# Patient Record
Sex: FEMALE | Race: WHITE | Employment: UNEMPLOYED | URBAN - METROPOLITAN AREA
[De-identification: names, ages, dates, MRNs, and addresses within clinical notes are randomized per-mention and may not be internally consistent; named-entity substitution may affect disease eponyms.]

---

## 2020-03-29 ENCOUNTER — APPOINTMENT (OUTPATIENT)
Dept: GENERAL RADIOLOGY | Age: 60
DRG: 853 | End: 2020-03-29
Attending: EMERGENCY MEDICINE
Payer: COMMERCIAL

## 2020-03-29 ENCOUNTER — APPOINTMENT (OUTPATIENT)
Dept: GENERAL RADIOLOGY | Age: 60
DRG: 853 | End: 2020-03-29
Attending: UROLOGY
Payer: COMMERCIAL

## 2020-03-29 ENCOUNTER — ANESTHESIA (OUTPATIENT)
Dept: SURGERY | Age: 60
DRG: 853 | End: 2020-03-29
Payer: COMMERCIAL

## 2020-03-29 ENCOUNTER — ANESTHESIA EVENT (OUTPATIENT)
Dept: SURGERY | Age: 60
DRG: 853 | End: 2020-03-29
Payer: COMMERCIAL

## 2020-03-29 ENCOUNTER — HOSPITAL ENCOUNTER (INPATIENT)
Age: 60
LOS: 2 days | Discharge: HOME | DRG: 853 | End: 2020-03-31
Attending: EMERGENCY MEDICINE | Admitting: SURGERY
Payer: COMMERCIAL

## 2020-03-29 DIAGNOSIS — A41.9 SEPSIS, DUE TO UNSPECIFIED ORGANISM, UNSPECIFIED WHETHER ACUTE ORGAN DYSFUNCTION PRESENT (HCC): Primary | ICD-10-CM

## 2020-03-29 PROBLEM — R57.1 HYPOVOLEMIC SHOCK (HCC): Status: ACTIVE | Noted: 2020-03-29

## 2020-03-29 PROBLEM — N18.4 KIDNEY DISEASE, CHRONIC, STAGE IV (SEVERE, EGFR 15-29 ML/MIN) (HCC): Chronic | Status: ACTIVE | Noted: 2020-03-29

## 2020-03-29 PROBLEM — E11.42 DIABETIC POLYNEUROPATHY ASSOCIATED WITH TYPE 2 DIABETES MELLITUS (HCC): Status: ACTIVE | Noted: 2020-03-29

## 2020-03-29 PROBLEM — N18.9 ACUTE KIDNEY INJURY SUPERIMPOSED ON CKD (HCC): Chronic | Status: ACTIVE | Noted: 2020-03-29

## 2020-03-29 PROBLEM — F41.1 GENERALIZED ANXIETY DISORDER: Chronic | Status: ACTIVE | Noted: 2020-03-29

## 2020-03-29 PROBLEM — N13.2 HYDRONEPHROSIS WITH OBSTRUCTING CALCULUS: Status: ACTIVE | Noted: 2020-03-29

## 2020-03-29 PROBLEM — N17.9 ACUTE KIDNEY INJURY SUPERIMPOSED ON CKD (HCC): Chronic | Status: ACTIVE | Noted: 2020-03-29

## 2020-03-29 PROBLEM — E86.0 SEVERE DEHYDRATION: Status: ACTIVE | Noted: 2020-03-29

## 2020-03-29 LAB
ABO + RH BLD: NORMAL
ADMINISTERED INITIALS, ADMINIT: NORMAL
ALBUMIN SERPL-MCNC: 2 G/DL (ref 3.5–5)
ALBUMIN SERPL-MCNC: 2 G/DL (ref 3.5–5)
ALBUMIN/GLOB SERPL: 0.5 {RATIO} (ref 1.1–2.2)
ALBUMIN/GLOB SERPL: 0.5 {RATIO} (ref 1.1–2.2)
ALP SERPL-CCNC: 146 U/L (ref 45–117)
ALP SERPL-CCNC: 153 U/L (ref 45–117)
ALT SERPL-CCNC: 32 U/L (ref 12–78)
ALT SERPL-CCNC: 33 U/L (ref 12–78)
ANION GAP SERPL CALC-SCNC: 11 MMOL/L (ref 5–15)
ANION GAP SERPL CALC-SCNC: 7 MMOL/L (ref 5–15)
APPEARANCE UR: CLEAR
APTT PPP: 33.6 SEC (ref 22.1–32)
AST SERPL-CCNC: 21 U/L (ref 15–37)
AST SERPL-CCNC: 22 U/L (ref 15–37)
ATRIAL RATE: 83 BPM
BACTERIA URNS QL MICRO: NEGATIVE /HPF
BASOPHILS # BLD: 0 K/UL (ref 0–0.1)
BASOPHILS NFR BLD: 0 % (ref 0–1)
BILIRUB DIRECT SERPL-MCNC: 0.2 MG/DL (ref 0–0.2)
BILIRUB SERPL-MCNC: 0.4 MG/DL (ref 0.2–1)
BILIRUB SERPL-MCNC: 0.4 MG/DL (ref 0.2–1)
BILIRUB UR QL: NEGATIVE
BLOOD GROUP ANTIBODIES SERPL: NORMAL
BUN SERPL-MCNC: 30 MG/DL (ref 6–20)
BUN SERPL-MCNC: 38 MG/DL (ref 6–20)
BUN/CREAT SERPL: 19 (ref 12–20)
BUN/CREAT SERPL: 22 (ref 12–20)
CALCIUM SERPL-MCNC: 8.3 MG/DL (ref 8.5–10.1)
CALCIUM SERPL-MCNC: 8.4 MG/DL (ref 8.5–10.1)
CALCULATED P AXIS, ECG09: 11 DEGREES
CALCULATED R AXIS, ECG10: 63 DEGREES
CALCULATED T AXIS, ECG11: 93 DEGREES
CHLORIDE SERPL-SCNC: 103 MMOL/L (ref 97–108)
CHLORIDE SERPL-SCNC: 110 MMOL/L (ref 97–108)
CO2 SERPL-SCNC: 18 MMOL/L (ref 21–32)
CO2 SERPL-SCNC: 21 MMOL/L (ref 21–32)
COLOR UR: ABNORMAL
CREAT SERPL-MCNC: 1.56 MG/DL (ref 0.55–1.02)
CREAT SERPL-MCNC: 1.76 MG/DL (ref 0.55–1.02)
D50 ADMINISTERED, D50ADM: 0 ML
D50 ORDER, D50ORD: 0 ML
DIAGNOSIS, 93000: NORMAL
DIFFERENTIAL METHOD BLD: ABNORMAL
EOSINOPHIL # BLD: 0 K/UL (ref 0–0.4)
EOSINOPHIL NFR BLD: 0 % (ref 0–7)
EPITH CASTS URNS QL MICRO: ABNORMAL /LPF
ERYTHROCYTE [DISTWIDTH] IN BLOOD BY AUTOMATED COUNT: 13.3 % (ref 11.5–14.5)
EST. AVERAGE GLUCOSE BLD GHB EST-MCNC: 346 MG/DL
GLOBULIN SER CALC-MCNC: 4.2 G/DL (ref 2–4)
GLOBULIN SER CALC-MCNC: 4.2 G/DL (ref 2–4)
GLSCOM COMMENTS: NORMAL
GLUCOSE BLD STRIP.AUTO-MCNC: 140 MG/DL (ref 65–100)
GLUCOSE BLD STRIP.AUTO-MCNC: 145 MG/DL (ref 65–100)
GLUCOSE BLD STRIP.AUTO-MCNC: 158 MG/DL (ref 65–100)
GLUCOSE BLD STRIP.AUTO-MCNC: 232 MG/DL (ref 65–100)
GLUCOSE BLD STRIP.AUTO-MCNC: 348 MG/DL (ref 65–100)
GLUCOSE BLD STRIP.AUTO-MCNC: 395 MG/DL (ref 65–100)
GLUCOSE BLD STRIP.AUTO-MCNC: 485 MG/DL (ref 65–100)
GLUCOSE SERPL-MCNC: 307 MG/DL (ref 65–100)
GLUCOSE SERPL-MCNC: 401 MG/DL (ref 65–100)
GLUCOSE UR STRIP.AUTO-MCNC: >1000 MG/DL
GLUCOSE, GLC: 140 MG/DL
GLUCOSE, GLC: 145 MG/DL
GLUCOSE, GLC: 158 MG/DL
GLUCOSE, GLC: 232 MG/DL
GLUCOSE, GLC: 345 MG/DL
GLUCOSE, GLC: 399 MG/DL
HBA1C MFR BLD: 13.7 % (ref 4–5.6)
HCT VFR BLD AUTO: 28.5 % (ref 35–47)
HGB BLD-MCNC: 8.8 G/DL (ref 11.5–16)
HGB UR QL STRIP: ABNORMAL
HIGH TARGET, HITG: 140 MG/DL
IMM GRANULOCYTES # BLD AUTO: 0 K/UL
IMM GRANULOCYTES NFR BLD AUTO: 0 %
INR PPP: 1.1 (ref 0.9–1.1)
INSULIN ADMINSTERED, INSADM: 10.2 UNITS/HOUR
INSULIN ADMINSTERED, INSADM: 11.4 UNITS/HOUR
INSULIN ADMINSTERED, INSADM: 4.8 UNITS/HOUR
INSULIN ADMINSTERED, INSADM: 4.9 UNITS/HOUR
INSULIN ADMINSTERED, INSADM: 5.1 UNITS/HOUR
INSULIN ADMINSTERED, INSADM: 6.9 UNITS/HOUR
INSULIN ORDER, INSORD: 10.2 UNITS/HOUR
INSULIN ORDER, INSORD: 11.4 UNITS/HOUR
INSULIN ORDER, INSORD: 4.8 UNITS/HOUR
INSULIN ORDER, INSORD: 4.9 UNITS/HOUR
INSULIN ORDER, INSORD: 5.1 UNITS/HOUR
INSULIN ORDER, INSORD: 6.9 UNITS/HOUR
KETONES UR QL STRIP.AUTO: ABNORMAL MG/DL
LACTATE SERPL-SCNC: 1.2 MMOL/L (ref 0.4–2)
LEUKOCYTE ESTERASE UR QL STRIP.AUTO: ABNORMAL
LIPASE SERPL-CCNC: 70 U/L (ref 73–393)
LOW TARGET, LOT: 100 MG/DL
LYMPHOCYTES # BLD: 1.3 K/UL (ref 0.8–3.5)
LYMPHOCYTES NFR BLD: 12 % (ref 12–49)
MAGNESIUM SERPL-MCNC: 1.8 MG/DL (ref 1.6–2.4)
MAGNESIUM SERPL-MCNC: 2 MG/DL (ref 1.6–2.4)
MCH RBC QN AUTO: 25 PG (ref 26–34)
MCHC RBC AUTO-ENTMCNC: 30.9 G/DL (ref 30–36.5)
MCV RBC AUTO: 81 FL (ref 80–99)
MINUTES UNTIL NEXT BG, NBG: 60 MIN
MONOCYTES # BLD: 0.8 K/UL (ref 0–1)
MONOCYTES NFR BLD: 7 % (ref 5–13)
MULTIPLIER, MUL: 0.03
MULTIPLIER, MUL: 0.04
MULTIPLIER, MUL: 0.04
MULTIPLIER, MUL: 0.05
MULTIPLIER, MUL: 0.06
MULTIPLIER, MUL: 0.06
NEUTS SEG # BLD: 9 K/UL (ref 1.8–8)
NEUTS SEG NFR BLD: 81 % (ref 32–75)
NITRITE UR QL STRIP.AUTO: NEGATIVE
NRBC # BLD: 0 K/UL (ref 0–0.01)
NRBC BLD-RTO: 0 PER 100 WBC
ORDER INITIALS, ORDINIT: NORMAL
P-R INTERVAL, ECG05: 152 MS
PH UR STRIP: 5 [PH] (ref 5–8)
PHOSPHATE SERPL-MCNC: 2.3 MG/DL (ref 2.6–4.7)
PHOSPHATE SERPL-MCNC: 3 MG/DL (ref 2.6–4.7)
PLATELET # BLD AUTO: 200 K/UL (ref 150–400)
PMV BLD AUTO: 10.7 FL (ref 8.9–12.9)
POTASSIUM SERPL-SCNC: 3.3 MMOL/L (ref 3.5–5.1)
POTASSIUM SERPL-SCNC: 3.6 MMOL/L (ref 3.5–5.1)
PROCALCITONIN SERPL-MCNC: 101.45 NG/ML
PROT SERPL-MCNC: 6.2 G/DL (ref 6.4–8.2)
PROT SERPL-MCNC: 6.2 G/DL (ref 6.4–8.2)
PROT UR STRIP-MCNC: NEGATIVE MG/DL
PROTHROMBIN TIME: 11.7 SEC (ref 9–11.1)
Q-T INTERVAL, ECG07: 386 MS
QRS DURATION, ECG06: 86 MS
QTC CALCULATION (BEZET), ECG08: 453 MS
RBC # BLD AUTO: 3.52 M/UL (ref 3.8–5.2)
RBC #/AREA URNS HPF: ABNORMAL /HPF (ref 0–5)
RBC MORPH BLD: ABNORMAL
SERVICE CMNT-IMP: ABNORMAL
SODIUM SERPL-SCNC: 132 MMOL/L (ref 136–145)
SODIUM SERPL-SCNC: 138 MMOL/L (ref 136–145)
SP GR UR REFRACTOMETRY: 1.01 (ref 1–1.03)
SPECIMEN EXP DATE BLD: NORMAL
THERAPEUTIC RANGE,PTTT: ABNORMAL SECS (ref 58–77)
UROBILINOGEN UR QL STRIP.AUTO: 0.2 EU/DL (ref 0.2–1)
VENTRICULAR RATE, ECG03: 83 BPM
WBC # BLD AUTO: 11.1 K/UL (ref 3.6–11)
WBC URNS QL MICRO: ABNORMAL /HPF (ref 0–4)

## 2020-03-29 PROCEDURE — 83735 ASSAY OF MAGNESIUM: CPT

## 2020-03-29 PROCEDURE — 74011000258 HC RX REV CODE- 258: Performed by: SURGERY

## 2020-03-29 PROCEDURE — 85610 PROTHROMBIN TIME: CPT

## 2020-03-29 PROCEDURE — 84100 ASSAY OF PHOSPHORUS: CPT

## 2020-03-29 PROCEDURE — 82962 GLUCOSE BLOOD TEST: CPT

## 2020-03-29 PROCEDURE — 87086 URINE CULTURE/COLONY COUNT: CPT

## 2020-03-29 PROCEDURE — 74011000258 HC RX REV CODE- 258: Performed by: ANESTHESIOLOGY

## 2020-03-29 PROCEDURE — 36415 COLL VENOUS BLD VENIPUNCTURE: CPT

## 2020-03-29 PROCEDURE — 87186 SC STD MICRODIL/AGAR DIL: CPT

## 2020-03-29 PROCEDURE — 76210000006 HC OR PH I REC 0.5 TO 1 HR: Performed by: UROLOGY

## 2020-03-29 PROCEDURE — BT141ZZ FLUOROSCOPY OF KIDNEYS, URETERS AND BLADDER USING LOW OSMOLAR CONTRAST: ICD-10-PCS | Performed by: UROLOGY

## 2020-03-29 PROCEDURE — 77030018832 HC SOL IRR H20 ICUM -A: Performed by: UROLOGY

## 2020-03-29 PROCEDURE — 74011250636 HC RX REV CODE- 250/636: Performed by: NURSE ANESTHETIST, CERTIFIED REGISTERED

## 2020-03-29 PROCEDURE — 85730 THROMBOPLASTIN TIME PARTIAL: CPT

## 2020-03-29 PROCEDURE — 0T778DZ DILATION OF LEFT URETER WITH INTRALUMINAL DEVICE, VIA NATURAL OR ARTIFICIAL OPENING ENDOSCOPIC: ICD-10-PCS | Performed by: UROLOGY

## 2020-03-29 PROCEDURE — 74011250637 HC RX REV CODE- 250/637: Performed by: SURGERY

## 2020-03-29 PROCEDURE — 74420 UROGRAPHY RTRGR +-KUB: CPT

## 2020-03-29 PROCEDURE — 77030019927 HC TBNG IRR CYSTO BAXT -A: Performed by: UROLOGY

## 2020-03-29 PROCEDURE — 94762 N-INVAS EAR/PLS OXIMTRY CONT: CPT

## 2020-03-29 PROCEDURE — 74011250636 HC RX REV CODE- 250/636: Performed by: HOSPITALIST

## 2020-03-29 PROCEDURE — 77030008684 HC TU ET CUF COVD -B: Performed by: ANESTHESIOLOGY

## 2020-03-29 PROCEDURE — 80076 HEPATIC FUNCTION PANEL: CPT

## 2020-03-29 PROCEDURE — 87077 CULTURE AEROBIC IDENTIFY: CPT

## 2020-03-29 PROCEDURE — 76010000138 HC OR TIME 0.5 TO 1 HR: Performed by: UROLOGY

## 2020-03-29 PROCEDURE — 74011250636 HC RX REV CODE- 250/636: Performed by: SURGERY

## 2020-03-29 PROCEDURE — C1758 CATHETER, URETERAL: HCPCS | Performed by: UROLOGY

## 2020-03-29 PROCEDURE — 86900 BLOOD TYPING SEROLOGIC ABO: CPT

## 2020-03-29 PROCEDURE — 80053 COMPREHEN METABOLIC PANEL: CPT

## 2020-03-29 PROCEDURE — 83036 HEMOGLOBIN GLYCOSYLATED A1C: CPT

## 2020-03-29 PROCEDURE — 84145 PROCALCITONIN (PCT): CPT

## 2020-03-29 PROCEDURE — 81001 URINALYSIS AUTO W/SCOPE: CPT

## 2020-03-29 PROCEDURE — 74011250637 HC RX REV CODE- 250/637: Performed by: HOSPITALIST

## 2020-03-29 PROCEDURE — 83605 ASSAY OF LACTIC ACID: CPT

## 2020-03-29 PROCEDURE — 77030026438 HC STYL ET INTUB CARD -A: Performed by: ANESTHESIOLOGY

## 2020-03-29 PROCEDURE — 65620000000 HC RM CCU GENERAL

## 2020-03-29 PROCEDURE — 74011000250 HC RX REV CODE- 250: Performed by: SURGERY

## 2020-03-29 PROCEDURE — 74011636637 HC RX REV CODE- 636/637: Performed by: ANESTHESIOLOGY

## 2020-03-29 PROCEDURE — 99285 EMERGENCY DEPT VISIT HI MDM: CPT

## 2020-03-29 PROCEDURE — P9045 ALBUMIN (HUMAN), 5%, 250 ML: HCPCS | Performed by: NURSE ANESTHETIST, CERTIFIED REGISTERED

## 2020-03-29 PROCEDURE — 76060000033 HC ANESTHESIA 1 TO 1.5 HR: Performed by: UROLOGY

## 2020-03-29 PROCEDURE — P9045 ALBUMIN (HUMAN), 5%, 250 ML: HCPCS | Performed by: SURGERY

## 2020-03-29 PROCEDURE — 74011000250 HC RX REV CODE- 250: Performed by: NURSE ANESTHETIST, CERTIFIED REGISTERED

## 2020-03-29 PROCEDURE — 83690 ASSAY OF LIPASE: CPT

## 2020-03-29 PROCEDURE — 85025 COMPLETE CBC W/AUTO DIFF WBC: CPT

## 2020-03-29 PROCEDURE — 93005 ELECTROCARDIOGRAM TRACING: CPT

## 2020-03-29 PROCEDURE — 74011636637 HC RX REV CODE- 636/637: Performed by: HOSPITALIST

## 2020-03-29 PROCEDURE — C2617 STENT, NON-COR, TEM W/O DEL: HCPCS | Performed by: UROLOGY

## 2020-03-29 DEVICE — URETERAL STENT
Type: IMPLANTABLE DEVICE | Status: FUNCTIONAL
Brand: CONTOUR™

## 2020-03-29 RX ORDER — MORPHINE SULFATE 4 MG/ML
INJECTION INTRAVENOUS AS NEEDED
Status: DISCONTINUED | OUTPATIENT
Start: 2020-03-29 | End: 2020-03-29 | Stop reason: HOSPADM

## 2020-03-29 RX ORDER — ALBUMIN HUMAN 50 G/1000ML
SOLUTION INTRAVENOUS AS NEEDED
Status: DISCONTINUED | OUTPATIENT
Start: 2020-03-29 | End: 2020-03-29 | Stop reason: HOSPADM

## 2020-03-29 RX ORDER — SODIUM CHLORIDE 0.9 % (FLUSH) 0.9 %
5-40 SYRINGE (ML) INJECTION AS NEEDED
Status: DISCONTINUED | OUTPATIENT
Start: 2020-03-29 | End: 2020-03-31 | Stop reason: HOSPADM

## 2020-03-29 RX ORDER — IBUPROFEN 800 MG/1
800 TABLET ORAL
COMMUNITY
End: 2020-03-31

## 2020-03-29 RX ORDER — ATORVASTATIN CALCIUM 20 MG/1
20 TABLET, FILM COATED ORAL DAILY
COMMUNITY

## 2020-03-29 RX ORDER — LIDOCAINE HYDROCHLORIDE 20 MG/ML
INJECTION, SOLUTION EPIDURAL; INFILTRATION; INTRACAUDAL; PERINEURAL AS NEEDED
Status: DISCONTINUED | OUTPATIENT
Start: 2020-03-29 | End: 2020-03-29 | Stop reason: HOSPADM

## 2020-03-29 RX ORDER — SUCCINYLCHOLINE CHLORIDE 20 MG/ML
INJECTION INTRAMUSCULAR; INTRAVENOUS AS NEEDED
Status: DISCONTINUED | OUTPATIENT
Start: 2020-03-29 | End: 2020-03-29 | Stop reason: HOSPADM

## 2020-03-29 RX ORDER — OMEPRAZOLE 20 MG/1
20 TABLET, DELAYED RELEASE ORAL DAILY
COMMUNITY

## 2020-03-29 RX ORDER — GABAPENTIN 100 MG/1
100 CAPSULE ORAL 3 TIMES DAILY
COMMUNITY

## 2020-03-29 RX ORDER — MAGNESIUM SULFATE 100 %
4 CRYSTALS MISCELLANEOUS AS NEEDED
Status: DISCONTINUED | OUTPATIENT
Start: 2020-03-29 | End: 2020-03-29 | Stop reason: SDUPTHER

## 2020-03-29 RX ORDER — SODIUM CHLORIDE, SODIUM LACTATE, POTASSIUM CHLORIDE, CALCIUM CHLORIDE 600; 310; 30; 20 MG/100ML; MG/100ML; MG/100ML; MG/100ML
75 INJECTION, SOLUTION INTRAVENOUS CONTINUOUS
Status: DISCONTINUED | OUTPATIENT
Start: 2020-03-29 | End: 2020-03-30

## 2020-03-29 RX ORDER — DEXTROSE MONOHYDRATE 100 MG/ML
0-250 INJECTION, SOLUTION INTRAVENOUS AS NEEDED
Status: DISCONTINUED | OUTPATIENT
Start: 2020-03-29 | End: 2020-03-31 | Stop reason: HOSPADM

## 2020-03-29 RX ORDER — PROPRANOLOL HYDROCHLORIDE 40 MG/1
20 TABLET ORAL 2 TIMES DAILY
COMMUNITY

## 2020-03-29 RX ORDER — PROPOFOL 10 MG/ML
INJECTION, EMULSION INTRAVENOUS AS NEEDED
Status: DISCONTINUED | OUTPATIENT
Start: 2020-03-29 | End: 2020-03-29 | Stop reason: HOSPADM

## 2020-03-29 RX ORDER — MORPHINE SULFATE 2 MG/ML
2 INJECTION, SOLUTION INTRAMUSCULAR; INTRAVENOUS
Status: DISCONTINUED | OUTPATIENT
Start: 2020-03-29 | End: 2020-03-31 | Stop reason: HOSPADM

## 2020-03-29 RX ORDER — GABAPENTIN 100 MG/1
100 CAPSULE ORAL 3 TIMES DAILY
Status: DISCONTINUED | OUTPATIENT
Start: 2020-03-29 | End: 2020-03-31 | Stop reason: HOSPADM

## 2020-03-29 RX ORDER — FENTANYL CITRATE 50 UG/ML
INJECTION, SOLUTION INTRAMUSCULAR; INTRAVENOUS AS NEEDED
Status: DISCONTINUED | OUTPATIENT
Start: 2020-03-29 | End: 2020-03-29 | Stop reason: HOSPADM

## 2020-03-29 RX ORDER — HYDROCODONE BITARTRATE AND ACETAMINOPHEN 5; 325 MG/1; MG/1
1 TABLET ORAL
COMMUNITY

## 2020-03-29 RX ORDER — MAGNESIUM SULFATE 100 %
4 CRYSTALS MISCELLANEOUS AS NEEDED
Status: DISCONTINUED | OUTPATIENT
Start: 2020-03-29 | End: 2020-03-30

## 2020-03-29 RX ORDER — SODIUM CHLORIDE 0.9 % (FLUSH) 0.9 %
5-40 SYRINGE (ML) INJECTION EVERY 8 HOURS
Status: DISCONTINUED | OUTPATIENT
Start: 2020-03-29 | End: 2020-03-31 | Stop reason: HOSPADM

## 2020-03-29 RX ORDER — TOPIRAMATE 25 MG/1
25 TABLET ORAL 2 TIMES DAILY
COMMUNITY

## 2020-03-29 RX ORDER — DEXTROSE MONOHYDRATE 100 MG/ML
0-250 INJECTION, SOLUTION INTRAVENOUS AS NEEDED
Status: DISCONTINUED | OUTPATIENT
Start: 2020-03-29 | End: 2020-03-29 | Stop reason: SDUPTHER

## 2020-03-29 RX ORDER — ALBUMIN HUMAN 50 G/1000ML
50 SOLUTION INTRAVENOUS ONCE
Status: COMPLETED | OUTPATIENT
Start: 2020-03-29 | End: 2020-03-29

## 2020-03-29 RX ORDER — CIPROFLOXACIN 500 MG/1
500 TABLET ORAL 2 TIMES DAILY
COMMUNITY
Start: 2020-03-27 | End: 2020-03-31

## 2020-03-29 RX ORDER — LOPERAMIDE HYDROCHLORIDE 2 MG/1
2 CAPSULE ORAL
COMMUNITY

## 2020-03-29 RX ORDER — SEMAGLUTIDE 1.34 MG/ML
0.5 INJECTION, SOLUTION SUBCUTANEOUS
COMMUNITY

## 2020-03-29 RX ORDER — SERTRALINE HYDROCHLORIDE 100 MG/1
100 TABLET, FILM COATED ORAL DAILY
COMMUNITY

## 2020-03-29 RX ORDER — PANTOPRAZOLE SODIUM 40 MG/1
40 TABLET, DELAYED RELEASE ORAL DAILY
Status: DISCONTINUED | OUTPATIENT
Start: 2020-03-29 | End: 2020-03-31 | Stop reason: HOSPADM

## 2020-03-29 RX ORDER — TAMSULOSIN HYDROCHLORIDE 0.4 MG/1
0.4 CAPSULE ORAL DAILY
COMMUNITY
Start: 2020-03-27 | End: 2020-04-02

## 2020-03-29 RX ORDER — ATORVASTATIN CALCIUM 20 MG/1
20 TABLET, FILM COATED ORAL DAILY
Status: DISCONTINUED | OUTPATIENT
Start: 2020-03-29 | End: 2020-03-31 | Stop reason: HOSPADM

## 2020-03-29 RX ORDER — MIDAZOLAM HYDROCHLORIDE 1 MG/ML
INJECTION, SOLUTION INTRAMUSCULAR; INTRAVENOUS AS NEEDED
Status: DISCONTINUED | OUTPATIENT
Start: 2020-03-29 | End: 2020-03-29 | Stop reason: HOSPADM

## 2020-03-29 RX ORDER — DEXTROSE MONOHYDRATE AND SODIUM CHLORIDE 5; .9 G/100ML; G/100ML
200 INJECTION, SOLUTION INTRAVENOUS CONTINUOUS
Status: DISCONTINUED | OUTPATIENT
Start: 2020-03-29 | End: 2020-03-29

## 2020-03-29 RX ORDER — ONDANSETRON 2 MG/ML
INJECTION INTRAMUSCULAR; INTRAVENOUS AS NEEDED
Status: DISCONTINUED | OUTPATIENT
Start: 2020-03-29 | End: 2020-03-29 | Stop reason: HOSPADM

## 2020-03-29 RX ORDER — GLIPIZIDE 10 MG/1
10 TABLET, FILM COATED, EXTENDED RELEASE ORAL DAILY
COMMUNITY

## 2020-03-29 RX ORDER — INSULIN LISPRO 100 [IU]/ML
INJECTION, SOLUTION INTRAVENOUS; SUBCUTANEOUS
Status: DISCONTINUED | OUTPATIENT
Start: 2020-03-29 | End: 2020-03-29

## 2020-03-29 RX ORDER — SODIUM CHLORIDE, SODIUM LACTATE, POTASSIUM CHLORIDE, CALCIUM CHLORIDE 600; 310; 30; 20 MG/100ML; MG/100ML; MG/100ML; MG/100ML
INJECTION, SOLUTION INTRAVENOUS
Status: DISCONTINUED | OUTPATIENT
Start: 2020-03-29 | End: 2020-03-29 | Stop reason: HOSPADM

## 2020-03-29 RX ORDER — ROCURONIUM BROMIDE 10 MG/ML
INJECTION, SOLUTION INTRAVENOUS AS NEEDED
Status: DISCONTINUED | OUTPATIENT
Start: 2020-03-29 | End: 2020-03-29 | Stop reason: HOSPADM

## 2020-03-29 RX ORDER — ONDANSETRON 4 MG/1
4 TABLET, ORALLY DISINTEGRATING ORAL
COMMUNITY

## 2020-03-29 RX ORDER — INSULIN LISPRO 100 [IU]/ML
INJECTION, SOLUTION INTRAVENOUS; SUBCUTANEOUS
Status: DISCONTINUED | OUTPATIENT
Start: 2020-03-29 | End: 2020-03-30

## 2020-03-29 RX ADMIN — Medication 10 ML: at 05:24

## 2020-03-29 RX ADMIN — PANTOPRAZOLE SODIUM 40 MG: 40 TABLET, DELAYED RELEASE ORAL at 12:13

## 2020-03-29 RX ADMIN — SODIUM CHLORIDE 1000 ML: 900 INJECTION, SOLUTION INTRAVENOUS at 04:15

## 2020-03-29 RX ADMIN — ALBUMIN (HUMAN) 250 ML: 12.5 INJECTION, SOLUTION INTRAVENOUS at 17:40

## 2020-03-29 RX ADMIN — ACETAMINOPHEN 650 MG: 650 SOLUTION ORAL at 08:22

## 2020-03-29 RX ADMIN — GABAPENTIN 100 MG: 100 CAPSULE ORAL at 12:13

## 2020-03-29 RX ADMIN — ATORVASTATIN CALCIUM 20 MG: 20 TABLET, FILM COATED ORAL at 12:13

## 2020-03-29 RX ADMIN — MORPHINE SULFATE 2 MG: 4 INJECTION INTRAVENOUS at 18:38

## 2020-03-29 RX ADMIN — Medication 10 ML: at 16:37

## 2020-03-29 RX ADMIN — SODIUM CHLORIDE, POTASSIUM CHLORIDE, SODIUM LACTATE AND CALCIUM CHLORIDE: 600; 310; 30; 20 INJECTION, SOLUTION INTRAVENOUS at 17:24

## 2020-03-29 RX ADMIN — SUCCINYLCHOLINE CHLORIDE 160 MG: 20 INJECTION, SOLUTION INTRAMUSCULAR; INTRAVENOUS at 17:35

## 2020-03-29 RX ADMIN — ROCURONIUM BROMIDE 10 MG: 10 SOLUTION INTRAVENOUS at 17:35

## 2020-03-29 RX ADMIN — MIDAZOLAM 1 MG: 1 INJECTION INTRAMUSCULAR; INTRAVENOUS at 18:38

## 2020-03-29 RX ADMIN — PIPERACILLIN AND TAZOBACTAM 3.38 G: 3; .375 INJECTION, POWDER, LYOPHILIZED, FOR SOLUTION INTRAVENOUS at 20:50

## 2020-03-29 RX ADMIN — SODIUM CHLORIDE 10.2 UNITS/HR: 9 INJECTION, SOLUTION INTRAVENOUS at 17:48

## 2020-03-29 RX ADMIN — ONDANSETRON HYDROCHLORIDE 4 MG: 2 INJECTION, SOLUTION INTRAMUSCULAR; INTRAVENOUS at 17:39

## 2020-03-29 RX ADMIN — SODIUM CHLORIDE 1000 ML: 900 INJECTION, SOLUTION INTRAVENOUS at 04:11

## 2020-03-29 RX ADMIN — INSULIN LISPRO 10 UNITS: 100 INJECTION, SOLUTION INTRAVENOUS; SUBCUTANEOUS at 12:31

## 2020-03-29 RX ADMIN — ALBUMIN (HUMAN) 50 G: 12.5 INJECTION, SOLUTION INTRAVENOUS at 05:24

## 2020-03-29 RX ADMIN — PIPERACILLIN AND TAZOBACTAM 3.38 G: 3; .375 INJECTION, POWDER, LYOPHILIZED, FOR SOLUTION INTRAVENOUS at 12:13

## 2020-03-29 RX ADMIN — DEXTROSE MONOHYDRATE AND SODIUM CHLORIDE 200 ML/HR: 5; .9 INJECTION, SOLUTION INTRAVENOUS at 05:26

## 2020-03-29 RX ADMIN — GABAPENTIN 100 MG: 100 CAPSULE ORAL at 21:00

## 2020-03-29 RX ADMIN — MIDAZOLAM 2 MG: 1 INJECTION INTRAMUSCULAR; INTRAVENOUS at 17:27

## 2020-03-29 RX ADMIN — SODIUM CHLORIDE, SODIUM LACTATE, POTASSIUM CHLORIDE, AND CALCIUM CHLORIDE 150 ML/HR: 600; 310; 30; 20 INJECTION, SOLUTION INTRAVENOUS at 09:04

## 2020-03-29 RX ADMIN — NOREPINEPHRINE BITARTRATE 4 MCG/MIN: 1 INJECTION, SOLUTION, CONCENTRATE INTRAVENOUS at 03:49

## 2020-03-29 RX ADMIN — Medication 10 ML: at 21:00

## 2020-03-29 RX ADMIN — LIDOCAINE HYDROCHLORIDE 60 MG: 20 INJECTION, SOLUTION EPIDURAL; INFILTRATION; INTRACAUDAL; PERINEURAL at 17:35

## 2020-03-29 RX ADMIN — ACETAMINOPHEN 650 MG: 650 SOLUTION ORAL at 16:37

## 2020-03-29 RX ADMIN — PROPOFOL 150 MG: 10 INJECTION, EMULSION INTRAVENOUS at 17:35

## 2020-03-29 RX ADMIN — FENTANYL CITRATE 100 MCG: 50 INJECTION, SOLUTION INTRAMUSCULAR; INTRAVENOUS at 17:35

## 2020-03-29 RX ADMIN — PIPERACILLIN AND TAZOBACTAM 3.38 G: 3; .375 INJECTION, POWDER, LYOPHILIZED, FOR SOLUTION INTRAVENOUS at 05:35

## 2020-03-29 NOTE — ANESTHESIA PREPROCEDURE EVALUATION
Anesthetic History   No history of anesthetic complications            Review of Systems / Medical History  Patient summary reviewed, nursing notes reviewed and pertinent labs reviewed    Pulmonary  Within defined limits                 Neuro/Psych         Psychiatric history     Cardiovascular  Within defined limits                Exercise tolerance: >4 METS     GI/Hepatic/Renal         Renal disease: ARF and CRI       Endo/Other    Diabetes         Other Findings   Comments: Urosepsis on norepi           Physical Exam    Airway  Mallampati: II  TM Distance: 4 - 6 cm  Neck ROM: normal range of motion   Mouth opening: Normal     Cardiovascular    Rhythm: regular  Rate: normal         Dental    Dentition: Edentulous     Pulmonary  Breath sounds clear to auscultation               Abdominal  Abdominal exam normal       Other Findings            Anesthetic Plan    ASA: 4, emergent  Anesthesia type: general          Induction: Intravenous  Anesthetic plan and risks discussed with: Patient and Family

## 2020-03-29 NOTE — PROGRESS NOTES
I have seen and examined the patient. She was admitted earlier this morning with septic shock from acute peylonephritis and hydronephrosis. She is currently on 4 mcg of levophed. Will consult urology for obstructing hydronephrosis. Continue weaning vasopressors.

## 2020-03-29 NOTE — PERIOP NOTES
Left Ureteral stent [id number], lot [id number], exp 2022-11-28. Inserted but changed to  Left ureteral stent [id number], [id number], exp 2022-11-13.

## 2020-03-29 NOTE — PROGRESS NOTES
Bedside and Verbal shift change report given to Dayana (oncoming nurse) by Kristian Raya (offgoing nurse). Report included the following information SBAR, Kardex, Intake/Output, MAR, Accordion, Recent Results, Cardiac Rhythm -NSR and Alarm Parameters . 2000 - Assumed care. CHG bath given. 0000 - MAPs 80s-90s, Levophed gtt stopped. 0100 - Pt very anxious, tearful, c/o L flank pain, PRN morphine given. 0400 - AM labs drawn. PRN tylenol given for HA. Bedside and Verbal shift change report given to Pretty (oncoming nurse) by Karlie Laurent (offgoing nurse). Report included the following information SBAR, Kardex, Intake/Output, MAR, Accordion, Recent Results, Cardiac Rhythm -NSR and Alarm Parameters .

## 2020-03-29 NOTE — ROUTINE PROCESS
TRANSFER - OUT REPORT: 
 
Verbal report given to TAYA Anne(name) on Dasha Astudillo  being transferred to ccu(unit) for routine progression of care Report consisted of patients Situation, Background, Assessment and  
Recommendations(SBAR). Information from the following report(s) SBAR, ED Summary, [facility] and Cardiac Rhythm nsr was reviewed with the receiving nurse. Lines:  
Triple Lumen Left Femoral (Active) Central Line Being Utilized No 3/29/2020  3:10 AM  
Site Assessment Clean, dry, & intact 3/29/2020  3:10 AM  
Infiltration Assessment 0 3/29/2020  3:10 AM  
Dressing Status Clean, dry, & intact 3/29/2020  3:10 AM  
Positive Blood Return (Medial Site) Yes 3/29/2020  3:10 AM  
Positive Blood Return (Lateral Site) Yes 3/29/2020  3:10 AM  
Positive Blood Return (Site #3) Yes 3/29/2020  3:10 AM  
   
Peripheral IV 03/29/20 Right Hand (Active) Site Assessment Clean, dry, & intact 3/29/2020  3:04 AM  
Phlebitis Assessment 0 3/29/2020  3:04 AM  
Infiltration Assessment 0 3/29/2020  3:04 AM  
Dressing Status Clean, dry, & intact 3/29/2020  3:04 AM  
Dressing Type Transparent 3/29/2020  3:04 AM  
Hub Color/Line Status Blue;Flushed;Patent 3/29/2020  3:04 AM  
Alcohol Cap Used No 3/29/2020  3:04 AM  
   
Peripheral IV 03/29/20 Left Forearm (Active) Site Assessment Clean, dry, & intact 3/29/2020  3:05 AM  
Phlebitis Assessment 0 3/29/2020  3:05 AM  
Infiltration Assessment 0 3/29/2020  3:05 AM  
Dressing Status Clean, dry, & intact 3/29/2020  3:05 AM  
Dressing Type Transparent 3/29/2020  3:05 AM  
Hub Color/Line Status Green;Flushed;Patent 3/29/2020  3:05 AM  
Alcohol Cap Used No 3/29/2020  3:05 AM  
  
 
Opportunity for questions and clarification was provided. Patient transported with: 
 Monitor O2 @ 2 liters Registered Nurse

## 2020-03-29 NOTE — ED TRIAGE NOTES
Pt transferred from [facility] for kidney stone and hypotension. Pt arrives on 1mcg/min levophed infusion.

## 2020-03-29 NOTE — BRIEF OP NOTE
Brief Postoperative Note    Patient: Arturo Mckeon  YOB: 1960  MRN: [id number]    Date of Procedure: 3/29/2020     Pre-Op Diagnosis: left uretral calculi    Post-Op Diagnosis: Same as preoperative diagnosis. and urosepsis      Procedure(s):  CYSTOSCOPY LEFT URETERAL STENT INSERTION. BILATERAL RETROGRADE.     Surgeon(s):  Yesenia Moseley MD    Surgical Assistant: None    Anesthesia: General     Estimated Blood Loss (mL): Minimal    Complications: None    Specimens:   ID Type Source Tests Collected by Time Destination   1 : left kidney Urine  CULTURE, ANAEROBIC, CULTURE, URINE, CULTURE & SMEAR, AFB, CULTURE, FUNGUS Yesenia Moseley MD 3/29/2020 6018 Microbiology        Implants: 6-26 cm stent left kidney  Drains: marsh  Nephroureteral Drain 03/29/20 Left Ureter (Active)       Findings: pus left kidney stones, left ureter calculi 6mm    Electronically Signed by Dane Tony MD on 3/29/2020 at 6:21 PM

## 2020-03-29 NOTE — H&P
[facility]    ICU Intensivist- Critical Care Progress Note    Name: Viry Thornton   : 1960   MRN: [id number]   Admit: 3/29/2020  2:48 AM      Diagnosis:     Principal Problem:    Hydronephrosis with obstructing calculus    Problem List:    Severe dehydration    Hypovolemic shock    Hydronephrosis with obstructing calculus    Acute kidney injury superimposed on CKD    Kidney disease, chronic, stage IV (severe, EGFR 15-29 ml/min)    Diabetic polyneuropathy associated with type 2 diabetes mellitus    Generalized anxiety disorder      ICU Comprehensive Plan of Care:     Plans for this Shift:   1. Fluid Resuscitation- intravascular volume expansion using isotonic crystalloids and colloids (Albumin). 2. Obstructing ureteral calculus- may requiring retrograde snare extraction of obstructing ureteral calculus. 3. MARGARETTE superimposed upon CKD (Stage IV)- monitor serial electrolytes and re      Subjective:     Progress Note:   3/29/2020   5:34 AM     Reason for ICU Admission:   Hydronephrosis with obstructing calculus       History of Present Illness:     Elzbieta Miltonr is a 56year-old W/F with HTN, NIDDM, CKD (Stage IV), s/p laparoscopic cholecystectomy, and generalized anxiety. Patient reports gradual onset Left flank moderately severe pain 2 days prior to admission, without radiation/shift. No fever/chills, previous similar episode. Past Medical History:      has no past medical history on file. Past Surgical History:      has no past surgical history on file.     Current Medications:     Current Facility-Administered Medications   Medication Dose Route Frequency    sodium chloride (NS) flush 5-40 mL  5-40 mL IntraVENous Q8H    sodium chloride (NS) flush 5-40 mL  5-40 mL IntraVENous PRN    NOREPINephrine (LEVOPHED) 8,000 mcg in dextrose 5% 250 mL infusion  2-30 mcg/min IntraVENous TITRATE    acetaminophen (TYLENOL) solution 650 mg  650 mg Oral Q4H PRN    piperacillin-tazobactam (ZOSYN) 3.375 g in 0.9% sodium chloride (MBP/ADV) 100 mL  3.375 g IntraVENous Q6H    piperacillin-tazobactam (ZOSYN) 3.375 g in 0.9% sodium chloride (MBP/ADV) 100 mL  3.375 g IntraVENous ONCE    dextrose 5% and 0.9% NaCl infusion  200 mL/hr IntraVENous CONTINUOUS       Allergies/Social/Family History: Allergies   Allergen Reactions    Exenatide Other (comments)     Knots in legs      Social History     Tobacco Use    Smoking status: Not on file   Substance Use Topics    Alcohol use: Not on file      History reviewed. No pertinent family history. Review of Systems:     A comprehensive review of systems was negative. Objective:   Vital Signs:  Visit Vitals  /43   Pulse 82   Temp 98.5 °F (36.9 °C)   Resp 19   SpO2 94%    O2 Flow Rate (L/min): 2 l/min O2 Device: Nasal cannula Temp (24hrs), Av.5 °F (36.9 °C), Min:98.5 °F (36.9 °C), Max:98.5 °F (36.9 °C)           Intake/Output:   No intake or output data in the 24 hours ending 20 0534    Physical Exam:  General:   Alert, cooperative, no distress, appears stated age. Eyes:   Conjunctivae/corneas clear. PERRL, EOMs intact. Ears:   Normal external ear canals bilaterally. Nose:   No drainage or sinus tenderness. Mouth/Throat:  Moist mucous membranes. Dentition normal.    Neck:  Symmetrical, trachea midline, no JVD or carotid bruit. Back:    Left CVA tenderness to percussion. Lungs:    Clear to auscultation bilaterally. Heart:   Regular rate and rhythm. S1, S2 normal, without murmur, click, rub or gallop. Abdomen:    Normoactive bowel sounds. Soft, non-tender, no masses or organomegaly. Extremities:  Atraumatic, no cyanosis or edema. Vascular:  Pulses 2+ and symmetric UE/LE bilat   Skin:  Normal color, non-diaphoretic, brisk capillary refill (;ess than 2 seconds). No rashes or lesions. Lymph nodes:  No Lymphadenopathy. Neurologic:  CN II-XII intact. Normal strength.         LABS AND  DATA: Personally reviewed  No results for input(s): WBC, HGB, HCT, PLT, HGBEXT, HCTEXT, PLTEXT in the last 72 hours. Recent Labs     03/29/20  0353   *   K 3.6      CO2 18*   BUN 38*   CREA 1.76*   *   CA 8.3*   MG 1.8   PHOS 3.0     Recent Labs     03/29/20  0353   SGOT 21  22   *  153*   TP 6.2*  6.2*   ALB 2.0*  2.0*   GLOB 4.2*  4.2*   LPSE 70*     Recent Labs     03/29/20  0353   INR 1.1   PTP 11.7*   APTT 33.6*      No results for input(s): PHI, PCO2I, PO2I, FIO2I in the last 72 hours. No results for input(s): CPK, CKMB, TROIQ, BNPP in the last 72 hours. Ventilator Settings:  Mode Rate Tidal Volume Pressure FiO2 PEEP                    Peak airway pressure:      Minute ventilation:          MEDS: Reviewed    RADIOLOGY:  No results found. Assessment:     Hospital Problems  Date Reviewed: 3/29/2020          Codes Class Noted POA    Hydronephrosis with obstructing calculus ICD-10-CM: N13.2  ICD-9-CM: 212 Acute 3/29/2020 Yes        Severe dehydration ICD-10-CM: E86.0  ICD-9-CM: 276.51 Acute 3/29/2020 Yes        Kidney disease, chronic, stage IV (severe, EGFR 15-29 ml/min) (HCC) (Chronic) ICD-10-CM: N18.4  ICD-9-CM: 585.4 End Stage 3/29/2020 Yes        Acute kidney injury superimposed on CKD (HCC) (Chronic) ICD-10-CM: N17.9, N18.9  ICD-9-CM: 866.00, 585.9 Acute 3/29/2020 Yes        Diabetic polyneuropathy associated with type 2 diabetes mellitus ([facility] ICD-10-CM: E11.42  ICD-9-CM: 250.60, 357.2 Chronic 3/29/2020 Yes        Generalized anxiety disorder (Chronic) ICD-10-CM: F41.1  ICD-9-CM: 300.02 Chronic 3/29/2020 Yes        Hypovolemic shock ([facility] ICD-10-CM: R57.1  ICD-9-CM: 785.59  3/29/2020 Yes              Multidisciplinary Rounds Completed: Yes    ABCDEF Bundle/Checklist  Pain Medications: Fentanyl  Target RASS: 0 - Alert & Calm - Spontaneously pays attention to caregiver  Sedation Medications: None  CAM-ICU:  Negative  Discussed Plan of Care (goals of care):  Yes  Addressed Code Status: Full Code    CARDIOVASCULAR  Cardiac Gtts: None  SBP Goal of: > 90 mmHg  MAP Goal of: > 65 mmHg  Transfusion Trigger (Hgb): <7 g/dL    RESPIRATORY  Vent Goals:   Head of bed > 30 degrees  DVT Prophylaxis (if no, list reason): SCD's or Sequential Compression Device   SPO2 Goal: > 92%  Pulmonary toilet: Incentive Spirometry     GI/  Kaur Catheter Present: Yes    ANTIBIOTICS  Antibiotics:  Zosyn    T/L/D  Tubes: None  Lines: Peripheral IV and Central Line  Drains: Kaur Catheter    SPECIAL EQUIPMENT  None    DISPOSITION  Stay in ICU    CRITICAL CARE CONSULTANT NOTE  I provided a face-to-face bedside physician/patient encounter, greater than the usual and customary amount normally needed, due to the high complexity of medical decision-making required. I reviewed and interpreted patient data including clinical events, labs, images, vital signs, I/O's, and examined patient. I have actively participated in multi-disciplinary discussions (Emergency Medicine, Radiology, CCU nursing staff) regarding the case in formulating an optimal therapeutic plan, and effecting a management strategy for this patient. NOTE OF PERSONAL INVOLVEMENT IN CARE   This patient has a high probability of imminent, clinically significant deterioration, which requires the highest level of preparedness to intervene urgently. I participated in the decision-making and personally managed, or directed the management of, a myriad of life and organ supporting interventions which required my frequent-interval clinical reassessments, in order to treat or prevent imminent deterioration. I personally spent 40 minutes of critical care time. This is time spent at this critically ill patient's bedside actively involved in patient care as well as the coordination of care and discussions with the patient's family. This does not include any procedural time which has been billed separately.     Marjie Holter, MD, [facility]  Staff Intensivist  [facility]  3/29/2020

## 2020-03-29 NOTE — PROGRESS NOTES
0730 Bedside and Verbal shift change report given to Pretty RN (oncoming nurse) by Nickolas Mg RN (offgoing nurse). Report included the following information SBAR, Kardex, ED Summary, Intake/Output, MAR, Recent Results and Cardiac Rhythm NSR.   0800 Pt c/o 7/10 pain - spoke w/ Dr. Edmond Washburn  - orders received for morphine 2mg q2h prn for severe pain  0810 Pt very anxious, unsure if she is allergic to morphine, would prefer tylenol at this time for pain  0830 Spoke w/ Dr. Vern Taveras - orders for BG checks, urology consult   0900 Dr. Swetha Aquino, urology at bedside - orders for pt to be NPO, plan for surgery today  1050 Plan for surgery at 1700  1600 CHG bath given  1630 TRANSFER - OUT REPORT:    Verbal report given to PACU RN(name) on Satnam Whitaker  being transferred to [facility]) for ordered procedure       Report consisted of patients Situation, Background, Assessment and   Recommendations(SBAR). Information from the following report(s) SBAR, Kardex, Procedure Summary, Intake/Output, MAR, Recent Results and Cardiac Rhythm NSR was reviewed with the receiving nurse. Lines:   Triple Lumen Left Femoral (Active)   Criteria for Appropriate Use Hemodynamically unstable, requiring monitoring lines, vasopressors, or volume resuscitation 3/29/2020 12:00 PM   Site Assessment Clean, dry, & intact 3/29/2020 12:00 PM   Infiltration Assessment 0 3/29/2020 12:00 PM   Affected Extremity/Extremities Color distal to insertion site pink (or appropriate for race) 3/29/2020 12:00 PM   Date of Last Dressing Change 03/28/20 3/29/2020 12:00 PM   Dressing Status Clean, dry, & intact 3/29/2020 12:00 PM   Dressing Type Disk with Chlorhexadine gluconate (CHG); Transparent 3/29/2020 12:00 PM   Action Taken Open ports on tubing capped 3/29/2020 12:00 PM   Proximal Hub Color/Line Status White; Infusing 3/29/2020 12:00 PM   Positive Blood Return (Medial Site) Yes 3/29/2020 12:00 PM   Medial Hub Color/Line Status Blue; Infusing 3/29/2020 12:00 PM Positive Blood Return (Lateral Site) Yes 3/29/2020 12:00 PM   Distal Hub Color/Line Status Brown; Infusing 3/29/2020 12:00 PM   Positive Blood Return (Site #3) Yes 3/29/2020 12:00 PM   Alcohol Cap Used Yes 3/29/2020 12:00 PM       Peripheral IV 03/29/20 Right Hand (Active)   Phlebitis Assessment 0 3/29/2020 12:00 PM   Infiltration Assessment 0 3/29/2020 12:00 PM   Dressing Status Clean, dry, & intact 3/29/2020 12:00 PM   Dressing Type Transparent 3/29/2020 12:00 PM   Hub Color/Line Status Blue;Flushed;Capped 3/29/2020 12:00 PM   Action Taken Open ports on tubing capped 3/29/2020 12:00 PM   Alcohol Cap Used Yes 3/29/2020 12:00 PM       Peripheral IV 03/29/20 Left Forearm (Active)   Phlebitis Assessment 0 3/29/2020 12:00 PM   Infiltration Assessment 0 3/29/2020 12:00 PM   Dressing Status Clean, dry, & intact 3/29/2020 12:00 PM   Dressing Type Transparent 3/29/2020 12:00 PM   Hub Color/Line Status Green;Flushed;Capped 3/29/2020 12:00 PM   Action Taken Open ports on tubing capped 3/29/2020 12:00 PM   Alcohol Cap Used Yes 3/29/2020 12:00 PM        Opportunity for questions and clarification was provided. Patient transported with:   Monitor  O2 @ 2 liters  Registered Nurse  Tech    TRANSFER - IN REPORT:    Verbal report received from [address] [place] [address] Glen RN(name) on Demetria Mendoza  being received from OR(unit) for routine post - op      Report consisted of patients Situation, Background, Assessment and   Recommendations(SBAR). Information from the following report(s) SBAR, Kardex, Procedure Summary, Intake/Output, MAR, Recent Results and Cardiac Rhythm NSR was reviewed with the receiving nurse. Opportunity for questions and clarification was provided. Assessment completed upon patients arrival to unit and care assumed. 1930 Bedside and Verbal shift change report given to Dayana BAIN (oncoming nurse) by Bonnie Oviedo RN (offgoing nurse).  Report included the following information SBAR, Kardex, Procedure Summary, Intake/Output, MAR, Recent Results and Cardiac Rhythm NSR.

## 2020-03-29 NOTE — CONSULTS
Urology Consult    Subjective:     Date of Consultation:  March 29, 2020    Referring Physicianfarbarry    Reason for Consultation:  stone    History of Present Illness:   Patient is a 56 y.o.  female who is being seen for left 6 mm upj stone with sepsis pain fever and requiring levophed 4 mcg. She was admitted to the hospital for Hypovolemic shock ([facility] [R57.1]. 3-5 days reported Sx   stable now with BP and UOP clear  Discussed emergent stent left  Will require fu for stone mgt  Discussed nephro tube option if unable to mgt   Ate breakfast small # applesauce    History reviewed. No pertinent past medical history. History reviewed. No pertinent surgical history. History reviewed. No pertinent family history. Social History     Tobacco Use    Smoking status: Not on file   Substance Use Topics    Alcohol use: Not on file     Allergies   Allergen Reactions    Exenatide Other (comments)     Knots in legs      Prior to Admission medications    Medication Sig Start Date End Date Taking? Authorizing Provider   sertraline (ZOLOFT) 100 mg tablet Take 100 mg by mouth daily. Yes Provider, Historical   glipiZIDE SR (GLUCOTROL XL) 10 mg CR tablet Take 10 mg by mouth daily. Yes Provider, Historical   tamsulosin (FLOMAX) 0.4 mg capsule Take 0.4 mg by mouth daily. Yes Provider, Historical   gabapentin (NEURONTIN) 100 mg capsule Take 100 mg by mouth three (3) times daily. Yes Provider, Historical   topiramate (TOPAMAX) 25 mg tablet Take 25 mg by mouth two (2) times a day. Yes Provider, Historical   loperamide (IMODIUM) 2 mg capsule Take 2 mg by mouth four (4) times daily as needed for Diarrhea. Yes Provider, Historical   propranoloL (INDERAL) 40 mg tablet Take  by mouth two (2) times a day. Take 1/2 tab twice a day   Yes Provider, Historical   ciprofloxacin HCl (CIPRO) 500 mg tablet Take 500 mg by mouth two (2) times a day.    Yes Provider, Historical   atorvastatin (LIPITOR) 20 mg tablet Take 20 mg by mouth daily. Yes Provider, Historical   omeprazole (PriLOSEC OTC) 20 mg tablet Take 20 mg by mouth daily. Yes Provider, Historical   ibuprofen (MOTRIN) 800 mg tablet Take 800 mg by mouth every eight (8) hours as needed for Pain. Yes Provider, Historical   HYDROcodone-acetaminophen (NORCO) 5-325 mg per tablet Take 1 Tab by mouth every six (6) hours as needed for Pain. Yes Provider, Historical   ondansetron (ZOFRAN ODT) 4 mg disintegrating tablet Take 4 mg by mouth every eight (8) hours as needed for Nausea or Vomiting. Yes Provider, Historical   semaglutide (Ozempic) 0.25 mg/0.2 mL (2 mg/1.5 mL) sub-q pen 0.5 mg by SubCUTAneous route every seven (7) days. Inject once a week. Every Saturday. Yes Provider, Historical         Review of Systems:  A comprehensive review of systems was negative except for that written in the HPI.     Objective:     Patient Vitals for the past 8 hrs:   BP Temp Pulse Resp SpO2 Height Weight   20 0900 108/65  82 22 91 %     20 0816      5' 4\" (1.626 m)    20 0800 119/59 98.3 °F (36.8 °C) 85 17 93 %     20 0700 103/51  75 14 94 %     20 0600 105/51  76 18 92 %     20 0515 108/58 98.7 °F (37.1 °C) 81 21 94 %  101.1 kg (222 lb 14.2 oz)   20 0430 114/43  82 19 94 %     20 0415 112/45  83 19 95 %     20 0400 102/40  85 20 95 %     20 0345 (!) 77/53  86 22 95 %     20 0330 97/70  84 21 94 %     20 0317 (!) 60/40  85 23 94 %     20 0315 (!) 71/56  82 19 96 %     20 0300 118/45  73 20 94 %     20 0259 105/42 98.5 °F (36.9 °C) 83 18 96 %       Temp (24hrs), Av.5 °F (36.9 °C), Min:98.3 °F (36.8 °C), Max:98.7 °F (37.1 °C)      Intake and Output:   1901 -  0700  In: 7437.2 [I.V.:7437.2]  Out: 500 [Urine:500]    Physical Exam:            General:    fatigued, cooperative, mild distress, appears older than stated age                     Skin:  no rash or abnormalities                HEENT:  Normal, NCAT        Throat/Neck:  normal and no erythema or exudates noted. Lymph nodes:  Cervical, supraclavicular, and axillary nodes normal.                 Lungs:  clear to auscultation bilaterally      Cardiovascular:  Regular rate and rhythm and no edema             Abdomen[de-identified]  soft, non-tender.  Bowel sounds normal. No masses,  no organomegaly, left CVAt warmth             Genitalia:  no lesions, no discharge, no CMT, no adnexal masses B          Extremities:  negative, cyanosis, clubbing       UA dirty  WBC 11 Scr 1.76  Hgb 8.8  CT results no disk - 6 mm prox ureter stone    Assessment:     Principal Problem:    Hydronephrosis with obstructing calculus (3/29/2020)    Active Problems:    Severe dehydration (3/29/2020)      Kidney disease, chronic, stage IV (severe, EGFR 15-29 ml/min) ([facility]) (3/29/2020)      Acute kidney injury superimposed on CKD ([facility] (3/29/2020)      Diabetic polyneuropathy associated with type 2 diabetes mellitus ([facility] (3/29/2020)      Generalized anxiety disorder (3/29/2020)      Hypovolemic shock ([facility]) (3/29/2020)         obstructing moderate Sx left prox ureter stone  MARGARETTE due to [facility], urosepsis  Stable now on LD levophed  Appears resucitaed to consider cysto and stent  Emergency declared - if unable to do recc IR consult for left NT consideration  Risks and benefirts reviewed decision for OR, left side marked where pain is  Reviewed with Dr Sakina Canales no pneumonia or pulm restrictions  Anesthesia to decide on timing with food ingestion    Plan:     Cysto retrograde and left stent  NPO  IVF  Consented

## 2020-03-29 NOTE — PROGRESS NOTES
Problem: Diabetes Self-Management  Goal: *Disease process and treatment process  Description: Define diabetes and identify own type of diabetes; list 3 options for treating diabetes. Outcome: Progressing Towards Goal  Goal: *Incorporating nutritional management into lifestyle  Description: Describe effect of type, amount and timing of food on blood glucose; list 3 methods for planning meals. Outcome: Progressing Towards Goal  Goal: *Incorporating physical activity into lifestyle  Description: State effect of exercise on blood glucose levels. Outcome: Progressing Towards Goal  Goal: *Developing strategies to promote health/change behavior  Description: Define the ABC's of diabetes; identify appropriate screenings, schedule and personal plan for screenings. Outcome: Progressing Towards Goal  Goal: *Using medications safely  Description: State effect of diabetes medications on diabetes; name diabetes medication taking, action and side effects. Outcome: Progressing Towards Goal  Goal: *Monitoring blood glucose, interpreting and using results  Description: Identify recommended blood glucose targets  and personal targets. Outcome: Progressing Towards Goal  Goal: *Prevention, detection, treatment of acute complications  Description: List symptoms of hyper- and hypoglycemia; describe how to treat low blood sugar and actions for lowering  high blood glucose level. Outcome: Progressing Towards Goal  Goal: *Prevention, detection and treatment of chronic complications  Description: Define the natural course of diabetes and describe the relationship of blood glucose levels to long term complications of diabetes.   Outcome: Progressing Towards Goal  Goal: *Developing strategies to address psychosocial issues  Description: Describe feelings about living with diabetes; identify support needed and support network  Outcome: Progressing Towards Goal  Goal: *Insulin pump training  Outcome: Progressing Towards Goal  Goal: *Sick day guidelines  Outcome: Progressing Towards Goal  Goal: *Patient Specific Goal (EDIT GOAL, INSERT TEXT)  Outcome: Progressing Towards Goal     Problem: Patient Education: Go to Patient Education Activity  Goal: Patient/Family Education  Outcome: Progressing Towards Goal     Problem: Falls - Risk of  Goal: *Absence of Falls  Description: Document Jc Edel Fall Risk and appropriate interventions in the flowsheet. Outcome: Progressing Towards Goal  Note: Fall Risk Interventions:  Mobility Interventions: Communicate number of staff needed for ambulation/transfer, Patient to call before getting OOB         Medication Interventions: Assess postural VS orthostatic hypotension, Evaluate medications/consider consulting pharmacy, Patient to call before getting OOB, Teach patient to arise slowly    Elimination Interventions: Call light in reach, Patient to call for help with toileting needs, Toileting schedule/hourly rounds              Problem: Patient Education: Go to Patient Education Activity  Goal: Patient/Family Education  Outcome: Progressing Towards Goal     Problem: Pressure Injury - Risk of  Goal: *Prevention of pressure injury  Description: Document Maurice Scale and appropriate interventions in the flowsheet. Outcome: Progressing Towards Goal  Note: Pressure Injury Interventions:             Activity Interventions: Assess need for specialty bed, Increase time out of bed, Pressure redistribution bed/mattress(bed type)         Nutrition Interventions: Document food/fluid/supplement intake                     Problem: Patient Education: Go to Patient Education Activity  Goal: Patient/Family Education  Outcome: Progressing Towards Goal     Problem: Kidney [address] (Adult)  Goal: *Elimination of kidney stone(s)  Outcome: Progressing Towards Goal  Goal: *Control of acute pain  Outcome: Progressing Towards Goal     Problem: Patient Education: Go to Patient Education Activity  Goal: Patient/Family Education  Outcome: Progressing Towards Goal     Problem: Hypotension  Goal: *Blood pressure within specified parameters  Outcome: Progressing Towards Goal  Goal: *Fluid volume balance  Outcome: Progressing Towards Goal  Goal: *Labs within defined limits  Outcome: Progressing Towards Goal     Problem: Patient Education: Go to Patient Education Activity  Goal: Patient/Family Education  Outcome: Progressing Towards Goal     Problem: Infection - Risk of, Urinary Catheter-Associated Urinary Tract Infection  Goal: *Absence of infection signs and symptoms  Outcome: Progressing Towards Goal     Problem: Patient Education: Go to Patient Education Activity  Goal: Patient/Family Education  Outcome: Progressing Towards Goal     Problem: Infection - Risk of, Central Venous Catheter-Associated Bloodstream Infection  Goal: *Absence of infection signs and symptoms  Outcome: Progressing Towards Goal     Problem: Patient Education: Go to Patient Education Activity  Goal: Patient/Family Education  Outcome: Progressing Towards Goal

## 2020-03-29 NOTE — PROGRESS NOTES
54 -TRANSFER - IN REPORT:    Verbal report received from Kandy(name) on Antonia Gutiérrez  being received from ER(unit) for routine progression of care      Report consisted of patients Situation, Background, Assessment and   Recommendations(SBAR). Information from the following report(s) SBAR, Kardex, ED Summary, Intake/Output, MAR, Accordion, Recent Results, Cardiac Rhythm -NSR and Alarm Parameters  was reviewed with the receiving nurse. Opportunity for questions and clarification was provided. Assessment completed upon patients arrival to unit and care assumed. Primary Nurse Mynor Guido RN and Jann Stephens RN performed a dual skin assessment on this patient No impairment noted    Current Bed:     ARTI Corona    Maurice score is 20      0515 - Pt up from ER. Oriented to room & unit. VSS, no c/o pain. Levophed gtt in place. CHG bath given. IVF, Albumin given per orders. Labs drawn. Bedside and Verbal shift change report given to Pretty (oncoming nurse) by Mallory Noguera (offgoing nurse). Report included the following information SBAR, Kardex, Intake/Output, MAR, Accordion, Recent Results, Cardiac Rhythm -NSR and Alarm Parameters .

## 2020-03-30 LAB
ADMINISTERED INITIALS, ADMINIT: NORMAL
ANION GAP SERPL CALC-SCNC: 7 MMOL/L (ref 5–15)
BACTERIA SPEC CULT: NORMAL
BUN SERPL-MCNC: 25 MG/DL (ref 6–20)
BUN/CREAT SERPL: 20 (ref 12–20)
CALCIUM SERPL-MCNC: 8.5 MG/DL (ref 8.5–10.1)
CHLORIDE SERPL-SCNC: 112 MMOL/L (ref 97–108)
CO2 SERPL-SCNC: 20 MMOL/L (ref 21–32)
CREAT SERPL-MCNC: 1.22 MG/DL (ref 0.55–1.02)
CRP SERPL-MCNC: 27.3 MG/DL (ref 0–0.6)
D50 ADMINISTERED, D50ADM: 0 ML
D50 ORDER, D50ORD: 0 ML
ERYTHROCYTE [SEDIMENTATION RATE] IN BLOOD: >140 MM/HR (ref 0–30)
GLSCOM COMMENTS: NORMAL
GLUCOSE BLD STRIP.AUTO-MCNC: 101 MG/DL (ref 65–100)
GLUCOSE BLD STRIP.AUTO-MCNC: 106 MG/DL (ref 65–100)
GLUCOSE BLD STRIP.AUTO-MCNC: 107 MG/DL (ref 65–100)
GLUCOSE BLD STRIP.AUTO-MCNC: 108 MG/DL (ref 65–100)
GLUCOSE BLD STRIP.AUTO-MCNC: 114 MG/DL (ref 65–100)
GLUCOSE BLD STRIP.AUTO-MCNC: 119 MG/DL (ref 65–100)
GLUCOSE BLD STRIP.AUTO-MCNC: 123 MG/DL (ref 65–100)
GLUCOSE BLD STRIP.AUTO-MCNC: 124 MG/DL (ref 65–100)
GLUCOSE BLD STRIP.AUTO-MCNC: 129 MG/DL (ref 65–100)
GLUCOSE BLD STRIP.AUTO-MCNC: 135 MG/DL (ref 65–100)
GLUCOSE BLD STRIP.AUTO-MCNC: 145 MG/DL (ref 65–100)
GLUCOSE BLD STRIP.AUTO-MCNC: 162 MG/DL (ref 65–100)
GLUCOSE BLD STRIP.AUTO-MCNC: 212 MG/DL (ref 65–100)
GLUCOSE BLD STRIP.AUTO-MCNC: 95 MG/DL (ref 65–100)
GLUCOSE BLD STRIP.AUTO-MCNC: 96 MG/DL (ref 65–100)
GLUCOSE SERPL-MCNC: 106 MG/DL (ref 65–100)
GLUCOSE, GLC: 101 MG/DL
GLUCOSE, GLC: 106 MG/DL
GLUCOSE, GLC: 107 MG/DL
GLUCOSE, GLC: 108 MG/DL
GLUCOSE, GLC: 114 MG/DL
GLUCOSE, GLC: 119 MG/DL
GLUCOSE, GLC: 123 MG/DL
GLUCOSE, GLC: 124 MG/DL
GLUCOSE, GLC: 129 MG/DL
GLUCOSE, GLC: 135 MG/DL
GLUCOSE, GLC: 95 MG/DL
GLUCOSE, GLC: 96 MG/DL
HIGH TARGET, HITG: 140 MG/DL
INSULIN ADMINSTERED, INSADM: 1.4 UNITS/HOUR
INSULIN ADMINSTERED, INSADM: 1.7 UNITS/HOUR
INSULIN ADMINSTERED, INSADM: 1.8 UNITS/HOUR
INSULIN ADMINSTERED, INSADM: 2.2 UNITS/HOUR
INSULIN ADMINSTERED, INSADM: 2.4 UNITS/HOUR
INSULIN ADMINSTERED, INSADM: 2.5 UNITS/HOUR
INSULIN ADMINSTERED, INSADM: 2.6 UNITS/HOUR
INSULIN ADMINSTERED, INSADM: 2.8 UNITS/HOUR
INSULIN ADMINSTERED, INSADM: 2.8 UNITS/HOUR
INSULIN ADMINSTERED, INSADM: 2.9 UNITS/HOUR
INSULIN ADMINSTERED, INSADM: 3.3 UNITS/HOUR
INSULIN ADMINSTERED, INSADM: 3.8 UNITS/HOUR
INSULIN ORDER, INSORD: 1.4 UNITS/HOUR
INSULIN ORDER, INSORD: 1.7 UNITS/HOUR
INSULIN ORDER, INSORD: 1.8 UNITS/HOUR
INSULIN ORDER, INSORD: 2.2 UNITS/HOUR
INSULIN ORDER, INSORD: 2.4 UNITS/HOUR
INSULIN ORDER, INSORD: 2.5 UNITS/HOUR
INSULIN ORDER, INSORD: 2.6 UNITS/HOUR
INSULIN ORDER, INSORD: 2.8 UNITS/HOUR
INSULIN ORDER, INSORD: 2.8 UNITS/HOUR
INSULIN ORDER, INSORD: 2.9 UNITS/HOUR
INSULIN ORDER, INSORD: 3.3 UNITS/HOUR
INSULIN ORDER, INSORD: 3.8 UNITS/HOUR
LOW TARGET, LOT: 100 MG/DL
MAGNESIUM SERPL-MCNC: 2 MG/DL (ref 1.6–2.4)
MINUTES UNTIL NEXT BG, NBG: 60 MIN
MULTIPLIER, MUL: 0.04
MULTIPLIER, MUL: 0.05
MULTIPLIER, MUL: 0.06
ORDER INITIALS, ORDINIT: NORMAL
POTASSIUM SERPL-SCNC: 3.3 MMOL/L (ref 3.5–5.1)
SERVICE CMNT-IMP: ABNORMAL
SERVICE CMNT-IMP: NORMAL
SODIUM SERPL-SCNC: 139 MMOL/L (ref 136–145)

## 2020-03-30 PROCEDURE — 65660000000 HC RM CCU STEPDOWN

## 2020-03-30 PROCEDURE — 77010033678 HC OXYGEN DAILY

## 2020-03-30 PROCEDURE — 74011250637 HC RX REV CODE- 250/637: Performed by: NURSE PRACTITIONER

## 2020-03-30 PROCEDURE — 74011250636 HC RX REV CODE- 250/636: Performed by: SURGERY

## 2020-03-30 PROCEDURE — 74011250637 HC RX REV CODE- 250/637: Performed by: HOSPITALIST

## 2020-03-30 PROCEDURE — 85652 RBC SED RATE AUTOMATED: CPT

## 2020-03-30 PROCEDURE — 74011250636 HC RX REV CODE- 250/636: Performed by: HOSPITALIST

## 2020-03-30 PROCEDURE — 36415 COLL VENOUS BLD VENIPUNCTURE: CPT

## 2020-03-30 PROCEDURE — 74011000258 HC RX REV CODE- 258: Performed by: SURGERY

## 2020-03-30 PROCEDURE — 74011636637 HC RX REV CODE- 636/637: Performed by: HOSPITALIST

## 2020-03-30 PROCEDURE — 74011636637 HC RX REV CODE- 636/637: Performed by: ANESTHESIOLOGY

## 2020-03-30 PROCEDURE — 86140 C-REACTIVE PROTEIN: CPT

## 2020-03-30 PROCEDURE — 74011000250 HC RX REV CODE- 250

## 2020-03-30 PROCEDURE — 74011250637 HC RX REV CODE- 250/637: Performed by: SURGERY

## 2020-03-30 PROCEDURE — 80048 BASIC METABOLIC PNL TOTAL CA: CPT

## 2020-03-30 PROCEDURE — 83735 ASSAY OF MAGNESIUM: CPT

## 2020-03-30 PROCEDURE — 82962 GLUCOSE BLOOD TEST: CPT

## 2020-03-30 RX ORDER — HEPARIN SODIUM 5000 [USP'U]/ML
5000 INJECTION, SOLUTION INTRAVENOUS; SUBCUTANEOUS EVERY 8 HOURS
Status: DISCONTINUED | OUTPATIENT
Start: 2020-03-30 | End: 2020-03-31 | Stop reason: HOSPADM

## 2020-03-30 RX ORDER — DEXTROSE MONOHYDRATE 100 MG/ML
0-250 INJECTION, SOLUTION INTRAVENOUS AS NEEDED
Status: DISCONTINUED | OUTPATIENT
Start: 2020-03-30 | End: 2020-03-30 | Stop reason: SDUPTHER

## 2020-03-30 RX ORDER — POTASSIUM CHLORIDE 750 MG/1
40 TABLET, FILM COATED, EXTENDED RELEASE ORAL
Status: COMPLETED | OUTPATIENT
Start: 2020-03-30 | End: 2020-03-30

## 2020-03-30 RX ORDER — INSULIN GLARGINE 100 [IU]/ML
15 INJECTION, SOLUTION SUBCUTANEOUS DAILY
Status: DISCONTINUED | OUTPATIENT
Start: 2020-03-30 | End: 2020-03-31 | Stop reason: HOSPADM

## 2020-03-30 RX ORDER — INSULIN LISPRO 100 [IU]/ML
INJECTION, SOLUTION INTRAVENOUS; SUBCUTANEOUS
Status: DISCONTINUED | OUTPATIENT
Start: 2020-03-30 | End: 2020-03-31 | Stop reason: HOSPADM

## 2020-03-30 RX ORDER — ASPIRIN 81 MG/1
81 TABLET ORAL DAILY
COMMUNITY

## 2020-03-30 RX ORDER — POTASSIUM CHLORIDE 750 MG/1
20 TABLET, FILM COATED, EXTENDED RELEASE ORAL 3 TIMES DAILY
Status: COMPLETED | OUTPATIENT
Start: 2020-03-30 | End: 2020-03-31

## 2020-03-30 RX ORDER — BACITRACIN 500 UNIT/G
PACKET (EA) TOPICAL
Status: COMPLETED
Start: 2020-03-30 | End: 2020-03-30

## 2020-03-30 RX ORDER — SERTRALINE HYDROCHLORIDE 50 MG/1
100 TABLET, FILM COATED ORAL DAILY
Status: DISCONTINUED | OUTPATIENT
Start: 2020-03-31 | End: 2020-03-31 | Stop reason: HOSPADM

## 2020-03-30 RX ORDER — MAGNESIUM SULFATE 100 %
4 CRYSTALS MISCELLANEOUS AS NEEDED
Status: DISCONTINUED | OUTPATIENT
Start: 2020-03-30 | End: 2020-03-31 | Stop reason: HOSPADM

## 2020-03-30 RX ADMIN — ACETAMINOPHEN 650 MG: 650 SOLUTION ORAL at 13:28

## 2020-03-30 RX ADMIN — POTASSIUM CHLORIDE 20 MEQ: 750 TABLET, FILM COATED, EXTENDED RELEASE ORAL at 16:22

## 2020-03-30 RX ADMIN — HEPARIN SODIUM 5000 UNITS: 5000 INJECTION INTRAVENOUS; SUBCUTANEOUS at 19:01

## 2020-03-30 RX ADMIN — BACITRACIN: 500 OINTMENT TOPICAL at 16:32

## 2020-03-30 RX ADMIN — PIPERACILLIN AND TAZOBACTAM 3.38 G: 3; .375 INJECTION, POWDER, LYOPHILIZED, FOR SOLUTION INTRAVENOUS at 12:43

## 2020-03-30 RX ADMIN — Medication 10 ML: at 16:22

## 2020-03-30 RX ADMIN — Medication 10 ML: at 21:59

## 2020-03-30 RX ADMIN — PIPERACILLIN AND TAZOBACTAM 3.38 G: 3; .375 INJECTION, POWDER, LYOPHILIZED, FOR SOLUTION INTRAVENOUS at 19:38

## 2020-03-30 RX ADMIN — PIPERACILLIN AND TAZOBACTAM 3.38 G: 3; .375 INJECTION, POWDER, LYOPHILIZED, FOR SOLUTION INTRAVENOUS at 03:10

## 2020-03-30 RX ADMIN — HEPARIN SODIUM 5000 UNITS: 5000 INJECTION INTRAVENOUS; SUBCUTANEOUS at 12:30

## 2020-03-30 RX ADMIN — INSULIN GLARGINE 15 UNITS: 100 INJECTION, SOLUTION SUBCUTANEOUS at 10:20

## 2020-03-30 RX ADMIN — ATORVASTATIN CALCIUM 20 MG: 20 TABLET, FILM COATED ORAL at 08:56

## 2020-03-30 RX ADMIN — GABAPENTIN 100 MG: 100 CAPSULE ORAL at 21:58

## 2020-03-30 RX ADMIN — INSULIN LISPRO 1 UNITS: 100 INJECTION, SOLUTION INTRAVENOUS; SUBCUTANEOUS at 21:58

## 2020-03-30 RX ADMIN — MORPHINE SULFATE 2 MG: 2 INJECTION, SOLUTION INTRAMUSCULAR; INTRAVENOUS at 01:14

## 2020-03-30 RX ADMIN — POTASSIUM CHLORIDE 20 MEQ: 750 TABLET, FILM COATED, EXTENDED RELEASE ORAL at 21:58

## 2020-03-30 RX ADMIN — GABAPENTIN 100 MG: 100 CAPSULE ORAL at 16:22

## 2020-03-30 RX ADMIN — POTASSIUM CHLORIDE 40 MEQ: 750 TABLET, FILM COATED, EXTENDED RELEASE ORAL at 10:20

## 2020-03-30 RX ADMIN — ACETAMINOPHEN 650 MG: 650 SOLUTION ORAL at 04:16

## 2020-03-30 RX ADMIN — GABAPENTIN 100 MG: 100 CAPSULE ORAL at 08:56

## 2020-03-30 RX ADMIN — INSULIN LISPRO 1 UNITS: 100 INJECTION, SOLUTION INTRAVENOUS; SUBCUTANEOUS at 08:55

## 2020-03-30 RX ADMIN — PANTOPRAZOLE SODIUM 40 MG: 40 TABLET, DELAYED RELEASE ORAL at 08:56

## 2020-03-30 RX ADMIN — Medication 10 ML: at 06:12

## 2020-03-30 NOTE — PROGRESS NOTES
Seen and examined by Dr. Sofi Vaughn this morning. Note to follow. Per Dr. Maggi Beasley, patient should not be discharged until cultures are back    Patient has planned f/u on with [facility] on 04/22 at 3:00 with Dr. Sofi Vaughn at the [facility] location. OK to remove marsh prior to dispo.

## 2020-03-30 NOTE — PROGRESS NOTES
Urology Progress Note    Patient: Scotty Dorsey MRN: [id number]  SSN: [id number]    YOB: 1960  Age: 56 y.o. Sex: female        ADMITTED: 3/29/2020 to Vandana Timmons MD for Hypovolemic shock ([address] [R57.1]  POD# 1 Day Post-Op Procedure(s):  CYSTOSCOPY LEFT URETERAL STENT INSERTION. BILATERAL RETROGRADE. I saw her this morning. Improving p stent placement. Off pressors. Cultures pending. Vitals: Temp (24hrs), Av.1 °F (37.3 °C), Min:98 °F (36.7 °C), Max:100.6 °F (38.1 °C)                   Blood pressure 125/61, pulse 83, temperature (!) 100.6 °F (38.1 °C), resp. rate 20, height 5' 4\" (1.626 m), weight 101.9 kg (224 lb 10.4 oz), SpO2 96 %. Intake and Output:   1901 -  0700  In: 13263.8 [I.V.:24566.8]  Out: 1128 [Urine:2685]            Labs:  Labs:   Lab Results   Component Value Date/Time    WBC 11.1 (H) 2020 06:00 AM    HGB 8.8 (L) 2020 06:00 AM    Creatinine 1.22 (H) 2020 04:11 AM         Assessment/Plan:   Await cultures. Needs 2 weeks of culture directed abx. Ok to remove marsh. F/U scheduled for next month. Will require ureteroscopy eventually.           Signed By: Seble Botello MD - 2020

## 2020-03-30 NOTE — ANESTHESIA POSTPROCEDURE EVALUATION
Post-Anesthesia Evaluation and Assessment    Patient: Trudy Heredia MRN: [id number]  SSN: [id number]    YOB: 1960  Age: 56 y.o. Sex: female      I have evaluated the patient and they are stable and ready for discharge from the PACU. Cardiovascular Function/Vital Signs  Visit Vitals  /53 (BP 1 Location: Left arm, BP Patient Position: At rest)   Pulse 80   Temp 36.8 °C (98.3 °F)   Resp 22   Ht 5' 4\" (1.626 m)   Wt 101.1 kg (222 lb 14.2 oz)   SpO2 90%   BMI 38.26 kg/m²       Patient is status post General anesthesia for Procedure(s):  CYSTOSCOPY LEFT URETERAL STENT INSERTION. BILATERAL RETROGRADE. .    Nausea/Vomiting: None    Postoperative hydration reviewed and adequate. Pain:  Pain Scale 1: Numeric (0 - 10) (03/29/20 1922)  Pain Intensity 1: 0 (03/29/20 1922)   Managed    Neurological Status:   Neuro (WDL): Within Defined Limits (03/29/20 1900)  Neuro  Neurologic State: Alert (03/29/20 0800)  Orientation Level: Oriented X4 (03/29/20 0800)  Cognition: Appropriate decision making; Appropriate for age attention/concentration; Appropriate safety awareness; Follows commands (03/29/20 0800)  Speech: Clear (03/29/20 0800)  LUE Motor Response: Purposeful (03/29/20 0800)  LLE Motor Response: Purposeful (03/29/20 0800)  RUE Motor Response: Purposeful (03/29/20 0800)  RLE Motor Response: Purposeful (03/29/20 0800)   At baseline    Mental Status, Level of Consciousness: Alert and  oriented to person, place, and time    Pulmonary Status:   O2 Device: Nasal cannula (03/29/20 1922)   Adequate oxygenation and airway patent    Complications related to anesthesia: None    Post-anesthesia assessment completed.  No concerns    Signed By: Roxy Phoenix MD     March 29, 2020

## 2020-03-30 NOTE — PROGRESS NOTES
Problem: Diabetes Self-Management  Goal: *Disease process and treatment process  Description: Define diabetes and identify own type of diabetes; list 3 options for treating diabetes. Outcome: Progressing Towards Goal  Goal: *Incorporating nutritional management into lifestyle  Description: Describe effect of type, amount and timing of food on blood glucose; list 3 methods for planning meals. Outcome: Progressing Towards Goal  Goal: *Incorporating physical activity into lifestyle  Description: State effect of exercise on blood glucose levels. Outcome: Progressing Towards Goal  Goal: *Developing strategies to promote health/change behavior  Description: Define the ABC's of diabetes; identify appropriate screenings, schedule and personal plan for screenings. Outcome: Progressing Towards Goal  Goal: *Using medications safely  Description: State effect of diabetes medications on diabetes; name diabetes medication taking, action and side effects. Outcome: Progressing Towards Goal  Goal: *Monitoring blood glucose, interpreting and using results  Description: Identify recommended blood glucose targets  and personal targets. Outcome: Progressing Towards Goal  Goal: *Prevention, detection, treatment of acute complications  Description: List symptoms of hyper- and hypoglycemia; describe how to treat low blood sugar and actions for lowering  high blood glucose level. Outcome: Progressing Towards Goal  Goal: *Prevention, detection and treatment of chronic complications  Description: Define the natural course of diabetes and describe the relationship of blood glucose levels to long term complications of diabetes.   Outcome: Progressing Towards Goal  Goal: *Developing strategies to address psychosocial issues  Description: Describe feelings about living with diabetes; identify support needed and support network  Outcome: Progressing Towards Goal  Goal: *Insulin pump training  Outcome: Progressing Towards Goal  Goal: *Sick day guidelines  Outcome: Progressing Towards Goal  Goal: *Patient Specific Goal (EDIT GOAL, INSERT TEXT)  Outcome: Progressing Towards Goal     Problem: Patient Education: Go to Patient Education Activity  Goal: Patient/Family Education  Outcome: Progressing Towards Goal     Problem: Falls - Risk of  Goal: *Absence of Falls  Description: Document Jcmaribel Hollingsworth Fall Risk and appropriate interventions in the flowsheet. Outcome: Progressing Towards Goal  Note: Fall Risk Interventions:  Mobility Interventions: Communicate number of staff needed for ambulation/transfer         Medication Interventions: Evaluate medications/consider consulting pharmacy, Patient to call before getting OOB, Teach patient to arise slowly    Elimination Interventions: Call light in reach, Patient to call for help with toileting needs, Toileting schedule/hourly rounds              Problem: Patient Education: Go to Patient Education Activity  Goal: Patient/Family Education  Outcome: Progressing Towards Goal     Problem: Pressure Injury - Risk of  Goal: *Prevention of pressure injury  Description: Document Maurice Scale and appropriate interventions in the flowsheet. Outcome: Progressing Towards Goal  Note: Pressure Injury Interventions:             Activity Interventions: Assess need for specialty bed, Increase time out of bed, Pressure redistribution bed/mattress(bed type)         Nutrition Interventions: Document food/fluid/supplement intake                     Problem: Patient Education: Go to Patient Education Activity  Goal: Patient/Family Education  Outcome: Progressing Towards Goal     Problem: Kidney [address] (Adult)  Goal: *Elimination of kidney stone(s)  Outcome: Progressing Towards Goal  Goal: *Control of acute pain  Outcome: Progressing Towards Goal     Problem: Patient Education: Go to Patient Education Activity  Goal: Patient/Family Education  Outcome: Progressing Towards Goal     Problem: Hypotension  Goal: *Blood pressure within specified parameters  Outcome: Progressing Towards Goal  Goal: *Fluid volume balance  Outcome: Progressing Towards Goal  Goal: *Labs within defined limits  Outcome: Progressing Towards Goal     Problem: Patient Education: Go to Patient Education Activity  Goal: Patient/Family Education  Outcome: Progressing Towards Goal     Problem: Infection - Risk of, Urinary Catheter-Associated Urinary Tract Infection  Goal: *Absence of infection signs and symptoms  Outcome: Progressing Towards Goal     Problem: Patient Education: Go to Patient Education Activity  Goal: Patient/Family Education  Outcome: Progressing Towards Goal     Problem: Infection - Risk of, Central Venous Catheter-Associated Bloodstream Infection  Goal: *Absence of infection signs and symptoms  Outcome: Progressing Towards Goal     Problem: Patient Education: Go to Patient Education Activity  Goal: Patient/Family Education  Outcome: Progressing Towards Goal

## 2020-03-30 NOTE — OP NOTES
[address]   OPERATIVE REPORT    Name:  Rome Hyman  MR#:  [id number]  :  1960  ACCOUNT #:  [de-identified]  DATE OF SERVICE:  2020      PREOPERATIVE DIAGNOSIS:  Obstructing left ureteral stone. POSTOPERATIVE DIAGNOSES:  1.  Obstructing left ureteral stone. 2.  Urosepsis. 3.  Pyonephritis. PROCEDURES PERFORMED:  1. Cystoscopy. 2.  Bilateral retrograde pyelograms. 3.  Left ureteral stent placement. 4.  Fluoroscopic imaging review intraoperatively. SURGEON:  Faith Vasquez MD    ASSISTANT:  none    ANESTHESIA:  General with local.    COMPLICATIONS:  none    SPECIMENS REMOVED:  Cx left kidney urine    IMPLANTS:  6 x 26 left ureteral stent. ESTIMATED BLOOD LOSS:  Zero. IV FLUIDS:  1500 mL. URINE OUTPUT:  Not recorded. FINDINGS:  Stones were not well visualized due to her body habitus, but a proximal 6-7 mm stone was noted on retrograde. Additional stones on the pyelogram were seen and images confirmed in the upper pole segment. Due to lack of availability of the CT scan, a right retrograde was normal with no ectopia of the ureters. Culture and aspirate of the left kidney was sent for routine culture. INDICATIONS:  The patient is a female from [facility] who was transferred emergently due to sepsis and placed on pressors. We were asked to see her this morning without a CT scan available for review, but a report revealing a 6-mm proximal ureteropelvic junction stone. Her white count was elevated and fever was noted along with requirement for pressors and findings significant for serious SIRS and urosepsis from this process. She consented for left stent placement after an informed discussion of risks, benefits, and alternatives. She agreed to follow up for stone intervention given the difficulties at [address] her care. Left side was marked and confirmed side and this was present on all reports.     PROCEDURE:  The patient was identified and a time-out was performed. A general endotracheal rapid sequence intubation was done but 8 hours were present between the time of last ingestion and intubation. Zosyn was the antibiotic given prior to the procedure. Kaur catheter was discontinued. SCDs and TEDs placed and the patient was prepped and draped in lithotomy position with all pressure points padded and secured. Grade II-III cystocele was present. Cystoscopy showed normal ureteral orifices and their orthotopic position with trabeculated bladder and no stones or purulence or obstruction noted. On x-ray fluoroscopy due to her body habitus, no calculi were visible. A gentle retrograde was done and time-out was confirmed prior to that. The retrograde showed a filling defect in the proximal ureter measuring 6-7 mm. Additional dye went up into the kidney with a small renal pelvis noted. Gentle pressure was provided and we did not over distend or see lymphovascular backflow. Additional stones were noted in the upper pole segments x2. Guidewire was passed and the open-ended placed up into the kidney where pyonephrosis was noted. We aspirated this and let it drip under mild pressure and sent it for culture routine. A 6 x 26 stent was then passed into the upper pole segment and confirmed to be in good position radiographically and cystoscopy with adequate effluent coming from the portholes of the stent showing purulent debris. The right side was also given a retrograde pyelogram as the CT scan was not apparent and available from the other hospital and this showed somewhat of a drooping right kidney, perhaps with malrotation but no filling defects or obstruction concerning for bilateral stones. Bladder was emptied, Kaur catheter was placed and all counts were reported as correct.   The patient's , Naag Rapahel, was discussed and he knows that she will need followup for her stone procedure and stent removal.        RITU GIRON, MD [email or web address]  D:  03/29/2020 18:18  T:  03/30/2020 0:11  JOB #:  [id number]

## 2020-03-30 NOTE — PROGRESS NOTES
1530 Bedside and Verbal shift change report given to TAYA Burger (oncoming nurse) by Cynthia Hogue (offgoing nurse). Report included the following information SBAR, Kardex, Intake/Output, MAR and Cardiac Rhythm NSR.   1630 Left femoral IJ removed without difficulty, pressure held x10min. 1645 Kaur removed  1730 TRANSFER - OUT REPORT:    Verbal report given to TAYA Smallwood(name) on Haseeb Franca  being transferred to (unit) for routine progression of care       Report consisted of patients Situation, Background, Assessment and   Recommendations(SBAR). Information from the following report(s) SBAR, Kardex, Intake/Output, MAR and Cardiac Rhythm SNR was reviewed with the receiving nurse. Lines:   Triple Lumen Left Femoral (Active)   Central Line Being Utilized Yes 3/30/2020  4:00 PM   Criteria for Appropriate Use Long term IV/antibiotic administration 3/30/2020  4:00 PM   Site Assessment Clean, dry, & intact 3/30/2020  4:00 PM   Infiltration Assessment 0 3/30/2020  4:00 PM   Affected Extremity/Extremities Color distal to insertion site pink (or appropriate for race) 3/30/2020  4:00 PM   Date of Last Dressing Change 03/28/20 3/30/2020  4:00 PM   Dressing Status Clean, dry, & intact 3/30/2020  4:00 PM   Dressing Type Disk with Chlorhexadine gluconate (CHG); Transparent 3/30/2020  4:00 PM   Action Taken Open ports on tubing capped 3/30/2020  4:00 PM   Proximal Hub Color/Line Status White 3/30/2020  4:00 PM   Positive Blood Return (Medial Site) Yes 3/30/2020  4:00 PM   Medial Hub Color/Line Status Blue 3/30/2020  4:00 PM   Positive Blood Return (Lateral Site) Yes 3/30/2020  4:00 PM   Distal Hub Color/Line Status Brown 3/30/2020  4:00 PM   Positive Blood Return (Site #3) Yes 3/30/2020  4:00 PM   Alcohol Cap Used Yes 3/30/2020  4:00 PM       Peripheral IV 03/29/20 Right Hand (Active)   Site Assessment Clean, dry, & intact 3/30/2020  4:00 PM   Phlebitis Assessment 0 3/30/2020  4:00 PM   Infiltration Assessment 0 3/30/2020  4:00 PM   Dressing Status Clean, dry, & intact 3/30/2020  4:00 PM   Dressing Type Transparent;Tape 3/30/2020  4:00 PM   Hub Color/Line Status Blue 3/30/2020  4:00 PM   Action Taken Open ports on tubing capped 3/30/2020  4:00 PM   Alcohol Cap Used Yes 3/30/2020  4:00 PM       Peripheral IV 03/29/20 Left Forearm (Active)   Site Assessment Clean, dry, & intact 3/30/2020  4:00 PM   Phlebitis Assessment 0 3/30/2020  4:00 PM   Infiltration Assessment 0 3/30/2020  4:00 PM   Dressing Status Clean, dry, & intact 3/30/2020  4:00 PM   Dressing Type Transparent;Tape 3/30/2020  4:00 PM   Hub Color/Line Status Pink 3/30/2020  4:00 PM   Action Taken Open ports on tubing capped 3/30/2020  4:00 PM   Alcohol Cap Used Yes 3/30/2020  4:00 PM        Opportunity for questions and clarification was provided.       Patient transported with:   Monitor  Registered Nurse

## 2020-03-30 NOTE — PROGRESS NOTES
Primary Nurse Allison Katz RN and Kerin Mortimer, RN performed a dual skin assessment on this patient Impairment noted- see wound doc flow sheet  Maurice score is 19  Small wounds that are scabbed over on abdomen- patient states she woke up digging her herself above navel  Dressing over femoral central line removal  Small area of bruising on upper right thigh

## 2020-03-30 NOTE — PROGRESS NOTES
Transitions of Care Plan:     Anticipate home in 24-48hrs; family to transport; awaiting cultures     Patient s/p POD#1 Cystoscopy Left Ureteral Stent Insertion. Bilateral Retrograde. Reason for Admission:   Hydronephrosis with obstructing calculus                   RUR Score:         12%            Plan for utilizing home health:      No need indicated at time of assessment    PCP: First and Last name:     Name of Practice:    Are you a current patient: Yes/No:    Approximate date of last visit:                     Current Advanced Directive/Advance Care Plan: Not on file. Transition of Care Plan:                      CM performed chart review for initial assessment. Patient is independent at baseline and resides with her spouse in [place], [place]. Please consult CM for transition of care needs. Clifton Burns, MPH    Care Management Interventions  PCP Verified by CM: No  Palliative Care Criteria Met (RRAT>21 & CHF Dx)?: No  Mode of Transport at Discharge:  Other (see comment)(Spouse, private car)  Transition of Care Consult (CM Consult): Discharge Planning  MyChart Signup: No  Discharge Durable Medical Equipment: No  Health Maintenance Reviewed: Yes  Physical Therapy Consult: No  Occupational Therapy Consult: No  Speech Therapy Consult: No  Current Support Network: Lives with Spouse  Confirm Follow Up Transport: Family  Discharge Location  Discharge Placement: Home with family assistance

## 2020-03-30 NOTE — DIABETES MGMT
MIRTA CRAWFORD  CLINICAL NURSE SPECIALIST CONSULT  [facility]    Presentation   Lucio Castro is a 56 y.o. female admitted on 3/29/20 with hydronephrosis with obstructing calculus and sepsis initially requiring vasopressors. Current clinical course has been complicated by hyperglycemia. Patient has known diabetes-Type 2  Consulted by Provider for advanced diabetes nursing assessment and care, specifically related to   [x] Transitioning off Chales Kiang   [x] Inpatient management strategy  [x] Home management assessment  [] Survival skill education    Diabetes-related medical history  Acute complications  hyperglycemia  Neurological complications  Peripheral neuropathy  Microvascular disease  Nephropathy  Macrovascular disease  none  Other associated conditions     HTN    Diabetes medication history  Drug class Currently in use Discontinued Never used   Biguanide      DDP-4 inhibitor       Sulfonylurea Glipizide 10mg daily     Thiazolidinedione      GLP-1 RA Ozempic .025/0.2ml weekly     SGLT-2 inhibitors      Basal insulin      Bolus insulin        Subjective   Ms. Nakita Rodriges is somewhat sleepy this morning but able to talk about her diabetes history. She was unaware her A1C was elevated at 13.7%. Last A1C before this one was in Feb. 2020 -10.1%  She stated \"it's probably my diet -I haven't been eating right\". She stated she has not been checking my BG. Monitoring pattern-hasn't been checking    Taking medications pattern  [x] Consistent administration  [x] Affordable    Social determinants of health impacting diabetes self-management practices   None voiced. Objective   Physical exam  General Alert, oriented and in no acute distress. Conversant and cooperative. Vital Signs   Visit Vitals  BP 92/58   Pulse 84   Temp 99.7 °F (37.6 °C)   Resp 25   Ht 5' 4\" (1.626 m)   Wt 101.9 kg (224 lb 10.4 oz)   SpO2 95%   BMI 38.56 kg/m²   . Heart   Regular rate and rhythm.  No murmurs, rubs or gallops  Lungs  Clear without rales or rhonchi  Extremities Diabetic foot exam: known peripheral neuropathy        Laboratory  Lab Results   Component Value Date/Time    Hemoglobin A1c 13.7 (H) 03/29/2020 03:53 AM     No results found for: LDL, LDLC, DLDLP  Lab Results   Component Value Date/Time    Creatinine 1.22 (H) 03/30/2020 04:11 AM     Lab Results   Component Value Date/Time    Sodium 139 03/30/2020 04:11 AM    Potassium 3.3 (L) 03/30/2020 04:11 AM    Chloride 112 (H) 03/30/2020 04:11 AM    CO2 20 (L) 03/30/2020 04:11 AM    Anion gap 7 03/30/2020 04:11 AM    Glucose 106 (H) 03/30/2020 04:11 AM    BUN 25 (H) 03/30/2020 04:11 AM    Creatinine 1.22 (H) 03/30/2020 04:11 AM    BUN/Creatinine ratio 20 03/30/2020 04:11 AM    GFR est AA 54 (L) 03/30/2020 04:11 AM    GFR est non-AA 45 (L) 03/30/2020 04:11 AM    Calcium 8.5 03/30/2020 04:11 AM    Bilirubin, total 0.4 03/29/2020 03:53 AM    Bilirubin, total 0.4 03/29/2020 03:53 AM    AST (SGOT) 21 03/29/2020 03:53 AM    AST (SGOT) 22 03/29/2020 03:53 AM    Alk. phosphatase 146 (H) 03/29/2020 03:53 AM    Alk.  phosphatase 153 (H) 03/29/2020 03:53 AM    Protein, total 6.2 (L) 03/29/2020 03:53 AM    Protein, total 6.2 (L) 03/29/2020 03:53 AM    Albumin 2.0 (L) 03/29/2020 03:53 AM    Albumin 2.0 (L) 03/29/2020 03:53 AM    Globulin 4.2 (H) 03/29/2020 03:53 AM    Globulin 4.2 (H) 03/29/2020 03:53 AM    A-G Ratio 0.5 (L) 03/29/2020 03:53 AM    A-G Ratio 0.5 (L) 03/29/2020 03:53 AM    ALT (SGPT) 33 03/29/2020 03:53 AM    ALT (SGPT) 32 03/29/2020 03:53 AM     Lab Results   Component Value Date/Time    ALT (SGPT) 33 03/29/2020 03:53 AM    ALT (SGPT) 32 03/29/2020 03:53 AM       Blood glucose pattern          Assessment and Plan   Nursing Diagnosis Risk for unstable blood glucose pattern   Nursing Intervention Domain 8957 Decision-making Support   Nursing Interventions Examined current inpatient diabetes control   Explored factors facilitating and impeding inpatient management  Identified self-management practices impeding diabetes control  Explored corrective strategies with patient and responsible inpatient provider   Informed patient of rational for insulin strategy while hospitalized  Instructed patient in      Evaluation   Ms. Suh,  with uncontrolled Type 2 diabetes, has achieved inpatient blood glucose target of 100-180mg/dl via insulin drip. Insulin drip to be transitioned to off today. Patient taking PO. Since A1C 13.7%, I anticipate her BG to rise with PO intake. After insulin drip discontinued, If BG levels rise >200mg/dl, initiate subcutaneous insulin order set with the following recommendations below. Inpatient blood glucose management has been impacted by  [x] Kidney dysfunction  [] Erratic meal consumption  [] Glucocorticoid use  [x]  sepsis___________________      Recommendations   Recommend:  Continue insulin infusion per protocol.     When transitioning off insulin infusion , POC Q6 hours  IF blood glucose reaches over 200, initiate hyperglycemia management with:    Basal insulin   O _0.2 units/kg/D=20 units    MEALTIME Bolus insulin (administer only if consuming >50% carbohydrates; HOLD for NPO)  O Normal sensitivity=10units    Corrective insulin  O Insulin-resistant sensitivity      Referral   [] Behavioral health services  [] Inpatient nutrition services  [] Pharmacy services for medication management  [] Diabetes Self-Management Training through [facility] (Phone [phone] to schedule appointment)    Billing Code(s)     [x] 92123 IP subsequent hospital care - 45 minutes      GEMINI England  Access via [place] [phone]

## 2020-03-30 NOTE — PROGRESS NOTES
Spiritual Care Assessment/Progress Note  [facility]      NAME: Amelia Montalvo      MRN: [id number]  AGE: 56 y.o. SEX: female  Taoism Affiliation: Gnosticist   Language: English     3/30/2020           Spiritual Assessment begun in [facility] [place] [facility] through conversation with:         []Patient        [] Family    [] Friend(s)        Reason for Consult: Initial/Spiritual assessment, critical care     Spiritual beliefs: (Please include comment if needed)     [] Identifies with a nadya tradition:         [] Supported by a nadya community:            [] Claims no spiritual orientation:           [] Seeking spiritual identity:                [] Adheres to an individual form of spirituality:           [x] Not able to assess:                           Identified resources for coping:      [] Prayer                               [] Music                  [] Guided Imagery     [] Family/friends                 [] Pet visits     [] Devotional reading                         [] Unknown     [] Other:                                          Interventions offered during this visit: (See comments for more details)    Patient Interventions: Initial visit, Other (comment)(Left note at bedside)     Family/Friend(s): Other (comment)(Left note at bedside)     Plan of Care:     [] Support spiritual and/or cultural needs    [] Support AMD and/or advance care planning process      [] Support grieving process   [] Coordinate Rites and/or Rituals    [] Coordination with community clergy   [] No spiritual needs identified at this time   [] Detailed Plan of Care below (See Comments)  [] Make referral to Music Therapy  [] Make referral to Pet Therapy     [] Make referral to Addiction services  [] Make referral to [facility]  [] Make referral to Spiritual Care Partner  [] No future visits requested        [] Follow up visits as needed     Comments: [facility]ited Ms Hermilo Swenson [facility].  Ms Hermilo Swenson appeared to be sleeping and no family was present. Left note at bedside assuring patient and family of  availability for support. : . Kerri Hanson.  Media Settler; [facility], to contact [phone] [address] call: [phone]

## 2020-03-30 NOTE — PROGRESS NOTES
0730: Verbal report received from Antonette(name) on Arturo Mckeon  being received from for routine progression of care      Report consisted of patients Situation, Background, Assessment and   Recommendations(SBAR). Information from the following report(s) SBAR, Kardex and Intake/Output was reviewed with the receiving nurse. Opportunity for questions and clarification was provided. Assessment completed upon patients arrival to unit and care assumed. 1000: Pt put on her wrist watch  (R) and said she likes to watch the Care channel    1300: Pt has questions about her insulin. She says she doesn't check her blood sugar at home. She takes an insulin  pen and oral pills.

## 2020-03-30 NOTE — PROGRESS NOTES
Pharmacist Admission Medication Reconciliation:    Unable to confirm with patient at this time. PTA medications reviewed and updated with Chart Review information from care at [facility]. It appears that Cipro and Flomax were prescribed 3/27/20 for 7 day course. Rx Query data available? ¹ NO  Reviewed active and held orders. YES    Thank you for allowing me to participate in this patient's care. Please call [phone] or [phone] with any questions. Muriel Rivero, Pharmacist          [facility] pharmacy benefit data reflects medications filled and processed through the patient's insurance,   however this data does NOT capture whether the medication was picked up or is currently being taken by the patient. Prior to Admission Medications:   Prior to Admission Medications   Prescriptions Last Dose Informant Taking? HYDROcodone-acetaminophen (NORCO) 5-325 mg per tablet   Yes   Sig: Take 1 Tab by mouth every six (6) hours as needed for Pain. aspirin delayed-release 81 mg tablet   Yes   Sig: Take 81 mg by mouth daily. atorvastatin (LIPITOR) 20 mg tablet   Yes   Sig: Take 20 mg by mouth daily. ciprofloxacin HCl (CIPRO) 500 mg tablet   Yes   Sig: Take 500 mg by mouth two (2) times a day. (For 7 days, starting 3/27/20)   gabapentin (NEURONTIN) 100 mg capsule   Yes   Sig: Take 100 mg by mouth three (3) times daily. glipiZIDE SR (GLUCOTROL XL) 10 mg CR tablet   Yes   Sig: Take 10 mg by mouth daily. ibuprofen (MOTRIN) 800 mg tablet   Yes   Sig: Take 800 mg by mouth every eight (8) hours as needed for Pain. loperamide (IMODIUM) 2 mg capsule   Yes   Sig: Take 2 mg by mouth four (4) times daily as needed for Diarrhea. omeprazole (PriLOSEC OTC) 20 mg tablet   Yes   Sig: Take 20 mg by mouth daily. ondansetron (ZOFRAN ODT) 4 mg disintegrating tablet   Yes   Sig: Take 4 mg by mouth every eight (8) hours as needed for Nausea or Vomiting.    propranoloL (INDERAL) 40 mg tablet   Yes   Sig: Take 20 mg by mouth two (2) times a day. semaglutide (Ozempic) 0.25 mg/0.2 mL (2 mg/1.5 mL) sub-q pen   Yes   Si.5 mg by SubCUTAneous route every seven (7) days. Started 20: Inject 0.25 mg every 7 days for 6 weeks, then increase to 0.5 mg every 7 days. sertraline (ZOLOFT) 100 mg tablet   Yes   Sig: Take 100 mg by mouth daily. tamsulosin (FLOMAX) 0.4 mg capsule   Yes   Sig: Take 0.4 mg by mouth daily. (For 7 days, starting 3/27/20)   topiramate (TOPAMAX) 25 mg tablet   Yes   Sig: Take 25 mg by mouth two (2) times a day.       Facility-Administered Medications: None    Allergies:  Exenatide

## 2020-03-30 NOTE — INTERDISCIPLINARY ROUNDS
IDR/SLIDR Summary Patient: Dennis Mckeon MRN: [id number]    Age: 56 y.o. YOB: 1960 Room/Bed: [address] [place] [address] Admit Diagnosis: Hypovolemic shock (HCC) [R57.1]  Principal Diagnosis: Hydronephrosis with obstructing calculus Goals: wean pressors, transfer out Readmission: NO  Quality Measure: SCIP 
VTE Prophylaxis: Mechanical 
Influenza Vaccine screening completed? YES Pneumococcal Vaccine screening completed? NO Mobility needs: Yes   Nutrition plan:Yes 
Consults:Respiratory and Case Management Financial concerns:Yes  Escalated to CM? YES 
RRAT Score: 13   Interventions:H2H Testing due for pt today? YES 
LOS: 1 days Expected length of stay ? days Discharge plan: tbd   PCP: UNKNOWN Transportation needs: Yes Days before discharge:two or more days before discharge Discharge disposition: Home Signed:  
 
Ana Bowling RN 
3/30/2020 
12:57 AM

## 2020-03-30 NOTE — PROGRESS NOTES
[facility] Adult  Hospitalist Group                                                                                          Critical Care Progress Note  Luly Toledo MD  Answering service: [phone] from in house phone        Date of Service:  3/30/2020  NAME:  Dasha Astudillo  :  1960  MRN:  [id number]      Interval history / Subjective:   Patient reports feeling better today. She is off vasopressors now. Denies chest pain, N, V, abd pain. On insulin gtt for hyperglycemia. Assessment & Plan:     Assessment:    Septic shock. Hydronephrosis with obstructing calculus    Severe dehydration    Acute kidney injury superimposed on CKD    Kidney disease, chronic, stage IV (severe, EGFR 15-29 ml/min)    Diabetic polyneuropathy associated with type 2 diabetes mellitus    Uncontrolled diabetes. Generalized anxiety disorder    Plan:  -Now off vasopressors.   -Closely monitor hemodynamics. -Hold BP meds. -Cut down LR to 75ml/hr.  -F/u urine Cx.  -stop insulin gtt and switch to basal bolus insulin.  -s/p ureteral stent by urology.  -Can transfer out of ICU. Code status: Full code. DVT prophylaxis: [address]    Care Plan discussed with: Patient/Family  Disposition: Transfer out of ICU.            Hospital Problems  Date Reviewed: 3/29/2020          Codes Class Noted POA    * (Principal) Hydronephrosis with obstructing calculus ICD-10-CM: N13.2  ICD-9-CM: 974 Acute 3/29/2020 Yes        Severe dehydration ICD-10-CM: E86.0  ICD-9-CM: 276.51 Acute 3/29/2020 Yes        Kidney disease, chronic, stage IV (severe, EGFR 15-29 ml/min) (HCC) (Chronic) ICD-10-CM: N18.4  ICD-9-CM: 585.4 End Stage 3/29/2020 Yes        Acute kidney injury superimposed on CKD (HCC) (Chronic) ICD-10-CM: N17.9, N18.9  ICD-9-CM: 866.00, 585.9 Acute 3/29/2020 Yes        Diabetic polyneuropathy associated with type 2 diabetes mellitus ([facility] ICD-10-CM: E11.42  ICD-9-CM: 250.60, 357.2 Chronic 3/29/2020 Yes Generalized anxiety disorder (Chronic) ICD-10-CM: F41.1  ICD-9-CM: 300.02 Chronic 3/29/2020 Yes        Hypovolemic shock (HCC) ICD-10-CM: R57.1  ICD-9-CM: 785.59  3/29/2020 Yes                Review of Systems:   A comprehensive review of systems was negative except for that written in the HPI. Vital Signs:    Last 24hrs VS reviewed since prior progress note. Most recent are:  Visit Vitals  /57   Pulse 85   Temp 99.7 °F (37.6 °C)   Resp 16   Ht 5' 4\" (1.626 m)   Wt 101.9 kg (224 lb 10.4 oz)   SpO2 95%   BMI 38.56 kg/m²         Intake/Output Summary (Last 24 hours) at 3/30/2020 1011  Last data filed at 3/30/2020 0800  Gross per 24 hour   Intake 3180.1 ml   Output 1685 ml   Net 1495.1 ml        Physical Examination:             Constitutional:  No acute distress, cooperative, pleasant    ENT:  Oral mucous moist, oropharynx benign. Resp:  CTA bilaterally. No wheezing/rhonchi/rales. No accessory muscle use   CV:  Regular rhythm, normal rate, no murmurs, gallops, rubs    GI:  Soft, non distended, non tender. normoactive bowel sounds, no hepatosplenomegaly     Musculoskeletal:  No edema, warm, 2+ pulses throughout    Neurologic:  Moves all extremities. AAOx3, CN II-XII reviewed           Data Review:    Review and/or order of clinical lab test      Labs:     Recent Labs     03/29/20  0600   WBC 11.1*   HGB 8.8*   HCT 28.5*        Recent Labs     03/30/20  0411 03/29/20  1828 03/29/20  0353    138 132*   K 3.3* 3.3* 3.6   * 110* 103   CO2 20* 21 18*   BUN 25* 30* 38*   CREA 1.22* 1.56* 1.76*   * 307* 401*   CA 8.5 8.4* 8.3*   MG 2.0 2.0 1.8   PHOS  --  2.3* 3.0     Recent Labs     03/29/20  0353   SGOT 21  22   ALT 33  32   *  153*   TBILI 0.4  0.4   TP 6.2*  6.2*   ALB 2.0*  2.0*   GLOB 4.2*  4.2*   LPSE 70*     Recent Labs     03/29/20  0353   INR 1.1   PTP 11.7*   APTT 33.6*      No results for input(s): FE, TIBC, PSAT, FERR in the last 72 hours.    No results found for: FOL, RBCF   No results for input(s): PH, PCO2, PO2 in the last 72 hours. No results for input(s): CPK, CKNDX, TROIQ in the last 72 hours.     No lab exists for component: CPKMB  No results found for: CHOL, CHOLX, CHLST, CHOLV, HDL, HDLP, LDL, LDLC, DLDLP, TGLX, TRIGL, TRIGP, CHHD, CHHDX  Lab Results   Component Value Date/Time    Glucose (POC) 135 (H) 03/30/2020 09:16 AM    Glucose (POC) 96 03/30/2020 08:09 AM    Glucose (POC) 119 (H) 03/30/2020 07:05 AM    Glucose (POC) 114 (H) 03/30/2020 06:06 AM    Glucose (POC) 129 (H) 03/30/2020 05:05 AM     Lab Results   Component Value Date/Time    Color YELLOW/STRAW 03/29/2020 03:53 AM    Appearance CLEAR 03/29/2020 03:53 AM    Specific gravity 1.014 03/29/2020 03:53 AM    pH (UA) 5.0 03/29/2020 03:53 AM    Protein NEGATIVE  03/29/2020 03:53 AM    Glucose >1,000 (A) 03/29/2020 03:53 AM    Ketone TRACE (A) 03/29/2020 03:53 AM    Bilirubin NEGATIVE  03/29/2020 03:53 AM    Urobilinogen 0.2 03/29/2020 03:53 AM    Nitrites NEGATIVE  03/29/2020 03:53 AM    Leukocyte Esterase TRACE (A) 03/29/2020 03:53 AM    Epithelial cells FEW 03/29/2020 03:53 AM    Bacteria NEGATIVE  03/29/2020 03:53 AM    WBC 0-4 03/29/2020 03:53 AM    RBC 0-5 03/29/2020 03:53 AM         Medications Reviewed:     Current Facility-Administered Medications   Medication Dose Route Frequency    potassium chloride SR (KLOR-CON 10) tablet 40 mEq  40 mEq Oral NOW    insulin glargine (LANTUS) injection 15 Units  15 Units SubCUTAneous DAILY    sodium chloride (NS) flush 5-40 mL  5-40 mL IntraVENous Q8H    sodium chloride (NS) flush 5-40 mL  5-40 mL IntraVENous PRN    NOREPINephrine (LEVOPHED) 8,000 mcg in dextrose 5% 250 mL infusion  2-30 mcg/min IntraVENous TITRATE    acetaminophen (TYLENOL) solution 650 mg  650 mg Oral Q4H PRN    piperacillin-tazobactam (ZOSYN) 3.375 g in 0.9% sodium chloride (MBP/ADV) 100 mL  3.375 g IntraVENous Q8H    morphine injection 2 mg  2 mg IntraVENous Q2H PRN    glucagon (GLUCAGEN) injection 1 mg  1 mg IntraMUSCular PRN    dextrose 10% infusion 0-250 mL  0-250 mL IntraVENous PRN    lactated Ringers infusion  150 mL/hr IntraVENous CONTINUOUS    atorvastatin (LIPITOR) tablet 20 mg  20 mg Oral DAILY    gabapentin (NEURONTIN) capsule 100 mg  100 mg Oral TID    pantoprazole (PROTONIX) tablet 40 mg  40 mg Oral DAILY    insulin regular (NOVOLIN R, HUMULIN R) 100 Units in 0.9% sodium chloride 100 mL infusion  0-50 Units/hr IntraVENous TITRATE    insulin lispro (HUMALOG) injection   SubCUTAneous TIDAC    glucose chewable tablet 16 g  4 Tab Oral PRN     ______________________________________________________________________  EXPECTED LENGTH OF STAY: - - -  ACTUAL LENGTH OF STAY:          1                 Merissa Aburto MD

## 2020-03-30 NOTE — PROGRESS NOTES
Spoke with Nakita Mehta NP for Urology. Discussed case and downgrade from ICU. Urology would normally have admitted this patient, however, she was admitted to ICU last night d/t need for pressers and obs after surgery. Dr. Bowen New to contact Dr. Carmel Rojo for sign out and to assume care as attending service now that she is downgraded from ICU status. Patient's BS is controlled at this time. Her A1c was 13.7 suggesting she has not been controlled and therefore goal would be to keep her BS under 300 for discharge. Diabetes Management Team is following and will educate patient and continue to make recommendations for IP as well as discharge management. As always, we are happy to consult if a need arises.

## 2020-03-30 NOTE — PROGRESS NOTES
[facility] Adult  Hospitalist Group     ICU Transfer/Accept Summary     [place] [address] [place]  DATE OF TRANSFER: 3/30/2020       PATIENT ID: Ekaterina Mayorga  MRN: [id number]   YOB: 1960    PRIMARY CARE PROVIDER: UNKNOWN   DATE OF ADMISSION: 3/29/2020  2:48 AM    ATTENDING PHYSICIAN: Bhavana Smith NP  CONSULTATIONS:   IP CONSULT TO UROLOGY    PROCEDURES/SURGERIES:   Procedure(s):  CYSTOSCOPY LEFT URETERAL STENT INSERTION. BILATERAL RETROGRADE. REASON FOR ADMISSION: Hydronephrosis with obstructing calculus     HOSPITAL PROBLEM LIST:  Patient Active Problem List   Diagnosis Code    Hypovolemic shock ([facility] R57.1    Hydronephrosis with obstructing calculus N13.2    Severe dehydration E86.0    Generalized anxiety disorder F41.1    Diabetic polyneuropathy associated with type 2 diabetes mellitus ([facility] E11.42    Kidney disease, chronic, stage IV (severe, EGFR 15-29 ml/min) ([facility]) N18.4    Acute kidney injury superimposed on CKD ([facility] N17.9, N18.9         Brief HPI and Hospital Course:      Lesvia De Santiago is a 56year-old W/F with HTN, NIDDM, CKD (Stage IV), s/p laparoscopic cholecystectomy, and generalized anxiety. Patient reports gradual onset Left flank moderately severe pain 2 days prior to admission, without radiation/shift. No fever/chills, previous similar episode. Patient shown to have obstructing renal calculi of the left kidney and urology placed after stent after being unable to remove obstructing calculi. Patient admitted to ICU for hypovolemic shock and was initially placed on vasopressors with aggressive fluid resuscitation. Patient also placed on insulin drip for hyperglycemia non- ac. Patient stabilized and vasopressors and insulin drip discontinued. Patient presents with a \"child like\" demeanor, she is oriented x 4 but is a poor historian and appears anxious/unsure of any question asked.   She denies CP, SOB, N/V/D, abd pain.      Assessment and Plan:    Hydronephrosis with obstructing calculus  Dr Manuel Llanos from urology placed ureteral stent  To continue to follow and round while inpatient  Currently denies pain    Pyelonephritis  Awaiting C/S  Continue IV zosyn  WBC mildly elevated  Repeat labs, will monitor and trend    Acute kidney injury superimposed on CKD  Improving  Cr 1.22, continues to trend down  Daily labs, will continue to monitor    Kidney disease, chronic, stage IV (severe, EGFR 15-29 ml/min)  Improving/stable  GFR continues to improve with creatinine  Daily labs, will continue to monitor    Diabetic polyneuropathy associated with type 2 diabetes mellitus  Off insulin drip  SSI/Lantus  Diabetic education    Hypokalemia  PO potassium  Check labs in a.m. Generalized anxiety disorder  Restart home sertraline  Supportive care                         PHYSICAL EXAMINATION:  Visit Vitals  /64   Pulse 84   Temp (!) 100.6 °F (38.1 °C)   Resp 20   Ht 5' 4\" (1.626 m)   Wt 101.9 kg (224 lb 10.4 oz)   SpO2 96%   BMI 38.56 kg/m²       General:          Alert, cooperative           Neck:               Symmetrical, supple  Lungs:             Clear to auscultation bilaterally. No Wheezing or Rhonchi. No rales. Heart:              Regular  rhythm,  No  murmur   No edema  Abdomen:       Soft, non-tender. Not distended. Bowel sounds normal  Extremities:     No cyanosis. +2 pedal edema. +2 distal pulses. Skin:                Not pale. Not Jaundiced  No rashes   Psych:             Anxious. Neurologic:      Alert, moves all extremities, oriented X 3, follows commands, equal strength, sensation intact.     CODE STATUS:  x Full Code    DNR    Partial    Comfort Care     Signed:   Mauro Bonner NP  Date of Service:  3/30/2020  3:16 PM

## 2020-03-31 VITALS
BODY MASS INDEX: 38.35 KG/M2 | TEMPERATURE: 98.9 F | OXYGEN SATURATION: 93 % | HEIGHT: 64 IN | DIASTOLIC BLOOD PRESSURE: 75 MMHG | WEIGHT: 224.65 LBS | SYSTOLIC BLOOD PRESSURE: 116 MMHG | HEART RATE: 74 BPM | RESPIRATION RATE: 22 BRPM

## 2020-03-31 PROBLEM — N13.2 HYDRONEPHROSIS WITH OBSTRUCTING CALCULUS: Status: RESOLVED | Noted: 2020-03-29 | Resolved: 2020-03-31

## 2020-03-31 PROBLEM — E86.0 SEVERE DEHYDRATION: Status: RESOLVED | Noted: 2020-03-29 | Resolved: 2020-03-31

## 2020-03-31 PROBLEM — R57.1 HYPOVOLEMIC SHOCK (HCC): Status: RESOLVED | Noted: 2020-03-29 | Resolved: 2020-03-31

## 2020-03-31 PROBLEM — N18.9 ACUTE KIDNEY INJURY SUPERIMPOSED ON CKD (HCC): Chronic | Status: RESOLVED | Noted: 2020-03-29 | Resolved: 2020-03-31

## 2020-03-31 PROBLEM — N17.9 ACUTE KIDNEY INJURY SUPERIMPOSED ON CKD (HCC): Chronic | Status: RESOLVED | Noted: 2020-03-29 | Resolved: 2020-03-31

## 2020-03-31 LAB
ANION GAP SERPL CALC-SCNC: 8 MMOL/L (ref 5–15)
BUN SERPL-MCNC: 19 MG/DL (ref 6–20)
BUN/CREAT SERPL: 17 (ref 12–20)
CALCIUM SERPL-MCNC: 8.7 MG/DL (ref 8.5–10.1)
CHLORIDE SERPL-SCNC: 109 MMOL/L (ref 97–108)
CO2 SERPL-SCNC: 19 MMOL/L (ref 21–32)
CREAT SERPL-MCNC: 1.13 MG/DL (ref 0.55–1.02)
ERYTHROCYTE [DISTWIDTH] IN BLOOD BY AUTOMATED COUNT: 14 % (ref 11.5–14.5)
GLUCOSE BLD STRIP.AUTO-MCNC: 189 MG/DL (ref 65–100)
GLUCOSE BLD STRIP.AUTO-MCNC: 221 MG/DL (ref 65–100)
GLUCOSE BLD STRIP.AUTO-MCNC: 269 MG/DL (ref 65–100)
GLUCOSE SERPL-MCNC: 192 MG/DL (ref 65–100)
HCT VFR BLD AUTO: 28.9 % (ref 35–47)
HGB BLD-MCNC: 9.1 G/DL (ref 11.5–16)
MCH RBC QN AUTO: 24.9 PG (ref 26–34)
MCHC RBC AUTO-ENTMCNC: 31.5 G/DL (ref 30–36.5)
MCV RBC AUTO: 79 FL (ref 80–99)
NRBC # BLD: 0 K/UL (ref 0–0.01)
NRBC BLD-RTO: 0 PER 100 WBC
PHOSPHATE SERPL-MCNC: 1.6 MG/DL (ref 2.6–4.7)
PLATELET # BLD AUTO: 241 K/UL (ref 150–400)
PMV BLD AUTO: 10.7 FL (ref 8.9–12.9)
POTASSIUM SERPL-SCNC: 4.3 MMOL/L (ref 3.5–5.1)
RBC # BLD AUTO: 3.66 M/UL (ref 3.8–5.2)
SERVICE CMNT-IMP: ABNORMAL
SODIUM SERPL-SCNC: 136 MMOL/L (ref 136–145)
WBC # BLD AUTO: 7.8 K/UL (ref 3.6–11)

## 2020-03-31 PROCEDURE — 74011636637 HC RX REV CODE- 636/637: Performed by: INTERNAL MEDICINE

## 2020-03-31 PROCEDURE — 74011000258 HC RX REV CODE- 258: Performed by: SURGERY

## 2020-03-31 PROCEDURE — 74011250637 HC RX REV CODE- 250/637: Performed by: INTERNAL MEDICINE

## 2020-03-31 PROCEDURE — 74011250636 HC RX REV CODE- 250/636: Performed by: HOSPITALIST

## 2020-03-31 PROCEDURE — 74011250636 HC RX REV CODE- 250/636: Performed by: INTERNAL MEDICINE

## 2020-03-31 PROCEDURE — 80048 BASIC METABOLIC PNL TOTAL CA: CPT

## 2020-03-31 PROCEDURE — 84100 ASSAY OF PHOSPHORUS: CPT

## 2020-03-31 PROCEDURE — 97116 GAIT TRAINING THERAPY: CPT

## 2020-03-31 PROCEDURE — 82962 GLUCOSE BLOOD TEST: CPT

## 2020-03-31 PROCEDURE — 74011250636 HC RX REV CODE- 250/636: Performed by: SURGERY

## 2020-03-31 PROCEDURE — 97165 OT EVAL LOW COMPLEX 30 MIN: CPT

## 2020-03-31 PROCEDURE — 74011250637 HC RX REV CODE- 250/637: Performed by: NURSE PRACTITIONER

## 2020-03-31 PROCEDURE — 74011250637 HC RX REV CODE- 250/637: Performed by: SURGERY

## 2020-03-31 PROCEDURE — 85027 COMPLETE CBC AUTOMATED: CPT

## 2020-03-31 PROCEDURE — 74011636637 HC RX REV CODE- 636/637: Performed by: HOSPITALIST

## 2020-03-31 PROCEDURE — 97535 SELF CARE MNGMENT TRAINING: CPT

## 2020-03-31 PROCEDURE — 74011250637 HC RX REV CODE- 250/637: Performed by: HOSPITALIST

## 2020-03-31 PROCEDURE — 97161 PT EVAL LOW COMPLEX 20 MIN: CPT

## 2020-03-31 PROCEDURE — 74011000258 HC RX REV CODE- 258: Performed by: INTERNAL MEDICINE

## 2020-03-31 PROCEDURE — 36415 COLL VENOUS BLD VENIPUNCTURE: CPT

## 2020-03-31 RX ORDER — PROPRANOLOL HYDROCHLORIDE 20 MG/1
20 TABLET ORAL 2 TIMES DAILY
Status: DISCONTINUED | OUTPATIENT
Start: 2020-03-31 | End: 2020-03-31 | Stop reason: HOSPADM

## 2020-03-31 RX ORDER — NYSTATIN 100000 [USP'U]/ML
500000 SUSPENSION ORAL 4 TIMES DAILY
Status: DISCONTINUED | OUTPATIENT
Start: 2020-03-31 | End: 2020-03-31 | Stop reason: HOSPADM

## 2020-03-31 RX ORDER — INSULIN LISPRO 100 [IU]/ML
10 INJECTION, SOLUTION INTRAVENOUS; SUBCUTANEOUS
Status: DISCONTINUED | OUTPATIENT
Start: 2020-03-31 | End: 2020-03-31 | Stop reason: HOSPADM

## 2020-03-31 RX ORDER — TOPIRAMATE 25 MG/1
25 TABLET ORAL 2 TIMES DAILY
Status: DISCONTINUED | OUTPATIENT
Start: 2020-03-31 | End: 2020-03-31 | Stop reason: HOSPADM

## 2020-03-31 RX ORDER — CEPHALEXIN 500 MG/1
500 CAPSULE ORAL 3 TIMES DAILY
Qty: 36 CAP | Refills: 0 | Status: SHIPPED | OUTPATIENT
Start: 2020-03-31 | End: 2020-04-12

## 2020-03-31 RX ORDER — SODIUM,POTASSIUM PHOSPHATES 280-250MG
1 POWDER IN PACKET (EA) ORAL 4 TIMES DAILY
Status: DISCONTINUED | OUTPATIENT
Start: 2020-03-31 | End: 2020-03-31 | Stop reason: HOSPADM

## 2020-03-31 RX ORDER — PEN NEEDLE, DIABETIC 33 GX5/32"
NEEDLE, DISPOSABLE MISCELLANEOUS
Qty: 200 EACH | Refills: 1 | Status: SHIPPED | OUTPATIENT
Start: 2020-03-31

## 2020-03-31 RX ORDER — SODIUM,POTASSIUM PHOSPHATES 280-250MG
1 POWDER IN PACKET (EA) ORAL 4 TIMES DAILY
Qty: 12 PACKET | Refills: 0 | Status: SHIPPED | OUTPATIENT
Start: 2020-03-31 | End: 2020-04-03

## 2020-03-31 RX ORDER — NYSTATIN 100000 [USP'U]/ML
500000 SUSPENSION ORAL 4 TIMES DAILY
Qty: 140 ML | Refills: 0 | Status: SHIPPED | OUTPATIENT
Start: 2020-03-31 | End: 2020-04-07

## 2020-03-31 RX ORDER — ASPIRIN 81 MG/1
81 TABLET ORAL DAILY
Status: DISCONTINUED | OUTPATIENT
Start: 2020-03-31 | End: 2020-03-31 | Stop reason: HOSPADM

## 2020-03-31 RX ORDER — INSULIN GLARGINE 100 [IU]/ML
15 INJECTION, SOLUTION SUBCUTANEOUS
Qty: 4.5 ML | Refills: 0 | Status: SHIPPED | OUTPATIENT
Start: 2020-03-31 | End: 2020-04-30

## 2020-03-31 RX ORDER — INSULIN LISPRO 100 [IU]/ML
INJECTION, SOLUTION INTRAVENOUS; SUBCUTANEOUS
Qty: 1 VIAL | Refills: 1 | Status: SHIPPED | OUTPATIENT
Start: 2020-03-31

## 2020-03-31 RX ADMIN — TOPIRAMATE 25 MG: 25 TABLET, FILM COATED ORAL at 10:32

## 2020-03-31 RX ADMIN — INSULIN LISPRO 10 UNITS: 100 INJECTION, SOLUTION INTRAVENOUS; SUBCUTANEOUS at 16:50

## 2020-03-31 RX ADMIN — PIPERACILLIN AND TAZOBACTAM 3.38 G: 3; .375 INJECTION, POWDER, LYOPHILIZED, FOR SOLUTION INTRAVENOUS at 04:37

## 2020-03-31 RX ADMIN — NYSTATIN 500000 UNITS: 100000 SUSPENSION ORAL at 11:58

## 2020-03-31 RX ADMIN — POTASSIUM & SODIUM PHOSPHATES POWDER PACK 280-160-250 MG 1 PACKET: 280-160-250 PACK at 10:32

## 2020-03-31 RX ADMIN — NYSTATIN 500000 UNITS: 100000 SUSPENSION ORAL at 16:51

## 2020-03-31 RX ADMIN — INSULIN LISPRO 3 UNITS: 100 INJECTION, SOLUTION INTRAVENOUS; SUBCUTANEOUS at 11:57

## 2020-03-31 RX ADMIN — PANTOPRAZOLE SODIUM 40 MG: 40 TABLET, DELAYED RELEASE ORAL at 09:25

## 2020-03-31 RX ADMIN — ACETAMINOPHEN 650 MG: 650 SOLUTION ORAL at 02:46

## 2020-03-31 RX ADMIN — Medication 10 ML: at 07:02

## 2020-03-31 RX ADMIN — GABAPENTIN 100 MG: 100 CAPSULE ORAL at 15:20

## 2020-03-31 RX ADMIN — INSULIN GLARGINE 15 UNITS: 100 INJECTION, SOLUTION SUBCUTANEOUS at 09:00

## 2020-03-31 RX ADMIN — PIPERACILLIN AND TAZOBACTAM 3.38 G: 3; .375 INJECTION, POWDER, LYOPHILIZED, FOR SOLUTION INTRAVENOUS at 11:59

## 2020-03-31 RX ADMIN — HEPARIN SODIUM 5000 UNITS: 5000 INJECTION INTRAVENOUS; SUBCUTANEOUS at 02:46

## 2020-03-31 RX ADMIN — Medication 10 ML: at 15:19

## 2020-03-31 RX ADMIN — SERTRALINE HYDROCHLORIDE 100 MG: 50 TABLET ORAL at 09:25

## 2020-03-31 RX ADMIN — ATORVASTATIN CALCIUM 20 MG: 20 TABLET, FILM COATED ORAL at 09:25

## 2020-03-31 RX ADMIN — POTASSIUM CHLORIDE 20 MEQ: 750 TABLET, FILM COATED, EXTENDED RELEASE ORAL at 09:25

## 2020-03-31 RX ADMIN — INSULIN LISPRO 2 UNITS: 100 INJECTION, SOLUTION INTRAVENOUS; SUBCUTANEOUS at 07:02

## 2020-03-31 RX ADMIN — PROPRANOLOL HYDROCHLORIDE 20 MG: 20 TABLET ORAL at 10:32

## 2020-03-31 RX ADMIN — HEPARIN SODIUM 5000 UNITS: 5000 INJECTION INTRAVENOUS; SUBCUTANEOUS at 10:32

## 2020-03-31 RX ADMIN — INSULIN LISPRO 10 UNITS: 100 INJECTION, SOLUTION INTRAVENOUS; SUBCUTANEOUS at 12:05

## 2020-03-31 RX ADMIN — POTASSIUM & SODIUM PHOSPHATES POWDER PACK 280-160-250 MG 1 PACKET: 280-160-250 PACK at 12:05

## 2020-03-31 RX ADMIN — ASPIRIN 81 MG: 81 TABLET, COATED ORAL at 10:32

## 2020-03-31 RX ADMIN — GABAPENTIN 100 MG: 100 CAPSULE ORAL at 09:25

## 2020-03-31 NOTE — PROGRESS NOTES
OCCUPATIONAL THERAPY EVALUATION/DISCHARGE  Patient: Bushra Mayorga (45 y.o. female)  Date: 3/31/2020  Primary Diagnosis: Hypovolemic shock ([address] [R57.1]  Procedure(s) (LRB):  CYSTOSCOPY LEFT URETERAL STENT INSERTION. BILATERAL RETROGRADE. (Left) 2 Days Post-Op   Precautions: none       ASSESSMENT  Based on the objective data described below, the patient presents close to baseline for ADLs and functional mobility s/p admission for cystoscopy, L ureteral stent POD 2. She was SBA for functional mobility without AD, dynamic standing during ADL tasks for donning gown as robe, brief and tasks at sink. VSS post activity. Current Level of Function (ADLs/self-care): SBA    Functional Outcome Measure: The patient scored 85 on the barthel outcome measure which is indicative of 15% impairment with ADLs and mobility,. Other factors to consider for discharge: independent prior     PLAN :  Recommend with staff: mobilize, SBA, ADLs    Recommendation for discharge: (in order for the patient to meet his/her long term goals)  No skilled occupational therapy/ follow up rehabilitation needs identified at this time. This discharge recommendation:  Has not yet been discussed the attending provider and/or case management    IF patient discharges home will need the following DME: none       SUBJECTIVE:   Patient stated I live with my .     OBJECTIVE DATA SUMMARY:   HISTORY:   History reviewed. No pertinent past medical history. History reviewed. No pertinent surgical history. Prior Level of Function/Environment/Context: lives with , independent, no falls. Does not drive,  transports her as needed. Pt performs household chores.   Expanded or extensive additional review of patient history:   Home Situation  Home Environment: Private residence  # Steps to Enter: 3  Rails to Enter: No  One/Two Story Residence: Two story, live on 1st floor  Living Alone: No  Support Systems: Spouse/Significant Other/Partner  Patient Expects to be Discharged to[de-identified] Private residence  Current DME Used/Available at Home: Thekathia Woodard, straight  Tub or Shower Type: Tub/Shower combination    Hand dominance: Right    EXAMINATION OF PERFORMANCE DEFICITS:  Cognitive/Behavioral Status:  Neurologic State: Alert  Orientation Level: Oriented X4  Cognition: Follows commands  Perception: Appears intact  Perseveration: No perseveration noted  Safety/Judgement: Awareness of environment    Skin: intact    Edema: none noted    Hearing: Auditory  Auditory Impairment: Hard of hearing, right side    Vision/Perceptual:                           Acuity: Within Defined Limits         Range of Motion:    AROM: Within functional limits  PROM: Within functional limits                      Strength:    Strength: Generally decreased, functional                Coordination:  Coordination: Within functional limits  Fine Motor Skills-Upper: Left Intact; Right Intact    Gross Motor Skills-Upper: Left Intact; Right Intact    Tone & Sensation:  Tone: Normal                         Balance:  Sitting: Intact  Standing: Intact; Without support    Functional Mobility and Transfers for ADLs:  Bed Mobility:       Transfers:  Sit to Stand: Stand-by assistance  Stand to Sit: Stand-by assistance    ADL Assessment:  Feeding: Independent    Oral Facial Hygiene/Grooming: Supervision    Bathing: Supervision    Upper Body Dressing: Setup    Lower Body Dressing: Supervision    Toileting: Supervision                ADL Intervention and task modifications:                                     Cognitive Retraining  Safety/Judgement: Awareness of environment      Functional Measure:  Barthel Index:    Bathin  Bladder: 5  Bowels: 10  Groomin  Dressing: 10  Feeding: 10  Mobility: 10  Stairs: 10  Toilet Use: 10  Transfer (Bed to Chair and Back): 15  Total: 85/100        The Barthel ADL Index: Guidelines  1.  The index should be used as a record of what a patient does, not as a record of what a patient could do. 2. The main aim is to establish degree of independence from any help, physical or verbal, however minor and for whatever reason. 3. The need for supervision renders the patient not independent. 4. A patient's performance should be established using the best available evidence. Asking the patient, friends/relatives and nurses are the usual sources, but direct observation and common sense are also important. However direct testing is not needed. 5. Usually the patient's performance over the preceding 24-48 hours is important, but occasionally longer periods will be relevant. 6. Middle categories imply that the patient supplies over 50 per cent of the effort. 7. Use of aids to be independent is allowed. Rl Lombardo., Barthel, D.W. (0912). Functional evaluation: the Barthel Index. [address] [facility] (14)2. Alana Riding je FRANKY Knutson, Rose Marie Cazares., Lorna Lynch., [place], [address] [place] [address] (1999). Measuring the change indisability after inpatient rehabilitation; comparison of the responsiveness of the Barthel Index and Functional Page Measure. Journal of Neurology, Neurosurgery, and Psychiatry, 66(4), 825-677. Tamie Mcguire, N.J.A, CHEL Chappell, & Tila Lindsey, M.A. (2004.) Assessment of post-stroke quality of life in cost-effectiveness studies: The usefulness of the Barthel Index and the EuroQoL-5D.  Quality of Life Research, 15, 221-40         Occupational Therapy Evaluation Charge Determination   History Examination Decision-Making   LOW Complexity : Brief history review  LOW Complexity : 1-3 performance deficits relating to physical, cognitive , or psychosocial skils that result in activity limitations and / or participation restrictions  LOW Complexity : No comorbidities that affect functional and no verbal or physical assistance needed to complete eval tasks       Based on the above components, the patient evaluation is determined to be of the following complexity level: LOW Pain Rating:  none    Activity Tolerance: WNL  Please refer to the flowsheet for vital signs taken during this treatment. After treatment patient left in no apparent distress:    Sitting in chair and Call bell within reach    COMMUNICATION/EDUCATION:   The patients plan of care was discussed with: Physical therapist and Registered nurse.      Thank you for this referral.  Charisse Dumont OT  Time Calculation: 29 mins

## 2020-03-31 NOTE — PROGRESS NOTES
Problem: Diabetes Self-Management  Goal: *Disease process and treatment process  Description: Define diabetes and identify own type of diabetes; list 3 options for treating diabetes. Outcome: Progressing Towards Goal  Goal: *Incorporating nutritional management into lifestyle  Description: Describe effect of type, amount and timing of food on blood glucose; list 3 methods for planning meals. Outcome: Progressing Towards Goal  Goal: *Incorporating physical activity into lifestyle  Description: State effect of exercise on blood glucose levels. Outcome: Progressing Towards Goal  Goal: *Developing strategies to promote health/change behavior  Description: Define the ABC's of diabetes; identify appropriate screenings, schedule and personal plan for screenings. Outcome: Progressing Towards Goal  Goal: *Using medications safely  Description: State effect of diabetes medications on diabetes; name diabetes medication taking, action and side effects. Outcome: Progressing Towards Goal  Goal: *Monitoring blood glucose, interpreting and using results  Description: Identify recommended blood glucose targets  and personal targets. Outcome: Progressing Towards Goal  Goal: *Prevention, detection, treatment of acute complications  Description: List symptoms of hyper- and hypoglycemia; describe how to treat low blood sugar and actions for lowering  high blood glucose level. Outcome: Progressing Towards Goal  Goal: *Prevention, detection and treatment of chronic complications  Description: Define the natural course of diabetes and describe the relationship of blood glucose levels to long term complications of diabetes.   Outcome: Progressing Towards Goal  Goal: *Developing strategies to address psychosocial issues  Description: Describe feelings about living with diabetes; identify support needed and support network  Outcome: Progressing Towards Goal  Goal: *Insulin pump training  Outcome: Progressing Towards Goal  Goal: *Sick day guidelines  Outcome: Progressing Towards Goal  Goal: *Patient Specific Goal (EDIT GOAL, INSERT TEXT)  Outcome: Progressing Towards Goal     Problem: Patient Education: Go to Patient Education Activity  Goal: Patient/Family Education  Outcome: Progressing Towards Goal     Problem: Falls - Risk of  Goal: *Absence of Falls  Description: Document Dena Kurtz Fall Risk and appropriate interventions in the flowsheet. Outcome: Progressing Towards Goal  Note: Fall Risk Interventions:  Mobility Interventions: Communicate number of staff needed for ambulation/transfer         Medication Interventions: Evaluate medications/consider consulting pharmacy, Patient to call before getting OOB, Teach patient to arise slowly    Elimination Interventions: Call light in reach              Problem: Patient Education: Go to Patient Education Activity  Goal: Patient/Family Education  Outcome: Progressing Towards Goal     Problem: Pressure Injury - Risk of  Goal: *Prevention of pressure injury  Description: Document Maurice Scale and appropriate interventions in the flowsheet.   Outcome: Progressing Towards Goal  Note: Pressure Injury Interventions:       Moisture Interventions: Absorbent underpads    Activity Interventions: Increase time out of bed    Mobility Interventions: Pressure redistribution bed/mattress (bed type)    Nutrition Interventions: Document food/fluid/supplement intake    Friction and Shear Interventions: Lift sheet                Problem: Patient Education: Go to Patient Education Activity  Goal: Patient/Family Education  Outcome: Progressing Towards Goal     Problem: Kidney [address] (Adult)  Goal: *Elimination of kidney stone(s)  Outcome: Progressing Towards Goal  Goal: *Control of acute pain  Outcome: Progressing Towards Goal     Problem: Patient Education: Go to Patient Education Activity  Goal: Patient/Family Education  Outcome: Progressing Towards Goal     Problem: Hypotension  Goal: *Blood pressure within specified parameters  Outcome: Progressing Towards Goal  Goal: *Fluid volume balance  Outcome: Progressing Towards Goal  Goal: *Labs within defined limits  Outcome: Progressing Towards Goal     Problem: Patient Education: Go to Patient Education Activity  Goal: Patient/Family Education  Outcome: Progressing Towards Goal     Problem: Infection - Risk of, Urinary Catheter-Associated Urinary Tract Infection  Goal: *Absence of infection signs and symptoms  Outcome: Progressing Towards Goal     Problem: Patient Education: Go to Patient Education Activity  Goal: Patient/Family Education  Outcome: Progressing Towards Goal     Problem: Infection - Risk of, Central Venous Catheter-Associated Bloodstream Infection  Goal: *Absence of infection signs and symptoms  Outcome: Progressing Towards Goal     Problem: Patient Education: Go to Patient Education Activity  Goal: Patient/Family Education  Outcome: Progressing Towards Goal

## 2020-03-31 NOTE — PROGRESS NOTES
Specialty appointment has been scheduled with Dr. Chong Rodriguez for Wednesday, 4/22/20 at 3:00 p.m. Pending patient discharge. Wagner Van, Care Management Specialist.     Attempted to schedule hospital follow up PCP appointment. Office will contact patient at home with appointment information.   Wagner Van, Care Management Specialist.

## 2020-03-31 NOTE — PROGRESS NOTES
Patient discharged to home. Patient alert and oriented x 4. Vital signs remain stable and patient is afebrile. Discharge instructions and prescriptions discussed with patient. Patient verbalizes understanding. Time allotted for questions. Daughter, Wes Sheridan called and discharge instructions reviewed with her. Patient and daughter instructed to call back with any additional questions. Patient and belongings transported to [facility] via wheel chair. PIVs removed  Discharge instructions and prescriptions given to patient.

## 2020-03-31 NOTE — PROGRESS NOTES
RUR 12%    LYUDMILA- Home with family today. Family to transport per patient. Home Health orders noted for home PT.  CRM met with the patient and offered freedom of choice. The patient stated that she does not want home health at this time. [facility]      [facility], [address] [place] [address]   [phone]

## 2020-03-31 NOTE — PROGRESS NOTES
PHYSICAL THERAPY EVALUATION/DISCHARGE  Patient: Antonia Gutiérrez (88 y.o. female)  Date: 3/31/2020  Primary Diagnosis: Hypovolemic shock ([address] [R57.1]  Procedure(s) (LRB):  CYSTOSCOPY LEFT URETERAL STENT INSERTION. BILATERAL RETROGRADE. (Left) 2 Days Post-Op   Precautions:          ASSESSMENT  Based on the objective data described below, the patient presents with baseline functional mobility post-op day 2 L ureteral stent insertion. Patient hospital stay has been complicated by admission to the ICU. At the time of evaluation she demonstrates stable VS. She ambulates and transfers with SBA. Patient demonstrates the ability to ascend and descend 13 stairs with one railing and CGA. She demonstrates intense anxiety at the top of the landing but is able to safely descend with step to method. Functional Outcome Measure: The patient scored 23/28 on the Tinetti outcome measure which is indicative of a high risk for falls. Other factors to consider for discharge: medical stability     Further skilled acute physical therapy is not indicated at this time. PLAN :  Recommendation for discharge: (in order for the patient to meet his/her long term goals)  Physical therapy at least 2 days/week in the home     This discharge recommendation:  Has not yet been discussed the attending provider and/or case management    IF patient discharges home will need the following DME: none       SUBJECTIVE:   Patient stated I don't take stairs often.     OBJECTIVE DATA SUMMARY:   HISTORY:    History reviewed. No pertinent past medical history. History reviewed. No pertinent surgical history.     Prior level of function: Independent   Personal factors and/or comorbidities impacting plan of care: please see above    Home Situation  Home Environment: Private residence  # Steps to Enter: 3  Rails to Enter: No  One/Two Story Residence: Two story, live on 1st floor  Living Alone: No  Support Systems: Spouse/Significant Other/Partner  Patient Expects to be Discharged to[de-identified] Private residence  Current DME Used/Available at Home: Tiffanie Coins, straight  Tub or Shower Type: Tub/Shower combination    EXAMINATION/PRESENTATION/DECISION MAKING:   Critical Behavior:  Neurologic State: Alert  Orientation Level: Oriented X4  Cognition: Follows commands     Hearing: Auditory  Auditory Impairment: Hard of hearing, right side  Skin:    Edema:   Range Of Motion:  AROM: Within functional limits           PROM: Within functional limits           Strength:    Strength: Generally decreased, functional                    Tone & Sensation:   Tone: Normal                              Coordination:  Coordination: Within functional limits  Vision:      Functional Mobility:  Bed Mobility:              Transfers:  Sit to Stand: Stand-by assistance  Stand to Sit: Stand-by assistance                       Balance:   Sitting: Intact  Standing: Intact; Without support  Ambulation/Gait Training:  Distance (ft): 100 Feet (ft)  Assistive Device: Gait belt  Ambulation - Level of Assistance: Stand-by assistance                 Base of Support: Widened                              Stairs:  Number of Stairs Trained: 13  Stairs - Level of Assistance: Contact guard assistance   Rail Use: Right     Therapeutic Exercises:       Functional Measure:  Tinetti test:    Sitting Balance: 1  Arises: 1  Attempts to Rise: 2  Immediate Standing Balance: 2  Standing Balance: 1  Nudged: 1  Eyes Closed: 1  Turn 360 Degrees - Continuous/Discontinuous: 1  Turn 360 Degrees - Steady/Unsteady: 1  Sitting Down: 1  Balance Score: 12 Balance total score  Indication of Gait: 1  R Step Length/Height: 1  L Step Length/Height: 1  R Foot Clearance: 1  L Foot Clearance: 1  Step Symmetry: 1  Step Continuity: 1  Path: 2  Trunk: 2  Walking Time: 0  Gait Score: 11 Gait total score  Total Score: 23/28 Overall total score         Tinetti Tool Score Risk of Falls  <19 = High Fall Risk  19-24 = Moderate Fall Risk  25-28 = Low Fall Risk  Kameron ME. Performance-Oriented Assessment of Mobility Problems in Elderly Patients. [address]; [id number]. (Scoring Description: PT Bulletin Feb. 10, 1993)    Older adults: Leo Beck et al, 2009; n = 1000 [facility] elderly evaluated with ABC, JUWAN, ADL, and IADL)  · Mean JUWAN score for males aged 69-68 years = 26.21(3.40)  · Mean JUWAN score for females age 69-68 years = 25.16(4.30)  · Mean JUWAN score for males over 80 years = 23.29(6.02)  · Mean JUWAN score for females over 80 years = 17.20(8.32)            Physical Therapy Evaluation Charge Determination   History Examination Presentation Decision-Making   MEDIUM  Complexity : 1-2 comorbidities / personal factors will impact the outcome/ POC  LOW Complexity : 1-2 Standardized tests and measures addressing body structure, function, activity limitation and / or participation in recreation  LOW Complexity : Stable, uncomplicated  Other outcome measures Tinetti  LOW       Based on the above components, the patient evaluation is determined to be of the following complexity level: LOW     Pain Rating:      Activity Tolerance:   Good  Please refer to the flowsheet for vital signs taken during this treatment. After treatment patient left in no apparent distress:   Sitting in chair and Call bell within reach    COMMUNICATION/EDUCATION:   The patients plan of care was discussed with: Occupational therapist and Registered nurse. Fall prevention education was provided and the patient/caregiver indicated understanding., Patient/family have participated as able in goal setting and plan of care. and Patient/family agree to work toward stated goals and plan of care.     Thank you for this referral.  Ngoc Luciano, PT   Time Calculation: 20 mins

## 2020-03-31 NOTE — PROGRESS NOTES
Hospitalist Progress Note  Kike Eric MD  Answering service: [phone] from in house phone        Date of Service:  3/31/2020  NAME:  Theophilus Essex  :  1960  MRN:  [id number]  PCP: Ana Anderson    Chief Complaint:   Chief Complaint   Patient presents with   [place]ank Pain         Admission Summary:     Theophilus Essex is a 56 y.o. female who presented with hypovolemic shock    Interval history / Subjective:   Patient seen for Follow up of CC: pyelonephritis  Patient seen and examined by the bedside, Labs, images and notes reviewed  comfortable, Denies any chest pain, abdominal pain, fevers, chills. No N/V/D  As per NS, patient was incontinent. Discussed with nursing staff, no acute issues overnight, orders reviewed. Assessment & Plan:     Hydronephrosis with obstructing calculus  Dr Angel Santoyo from urology placed ureteral stent  To continue to follow and round while inpatient  Currently denies pain     Pyelonephritis  Awaiting C/S: 2 strains of E. Coli, pan-sensitive, 3rd strain eval pending  Continue IV zosyn, will place on Keflex if all strains sensitive  WBC improving, repeat labs in am  Urology notes reviewed, recs 2 weeks of ABX therapy     Acute kidney injury superimposed on CKD  Improving  Cr 1.22, continues to trend down  Daily labs, will continue to monitor  Repeat labs in am    IDDM2 with long term insulin use, diabetic polyneuropathy, uncontrolled  Off insulin drip  SSI/Lantus/ HbA1c 13  Diabetic education will be consulted  Cont Gabapentin     Hypokalemia  PO potassium  Hypophosphatemia: replete, repeat labs in am    Generalized anxiety disorder  Restart home sertraline  Supportive care    Code status: Full Code    DVT prophylaxis: 3 [facility] discussed with: Patient/Family and Nurse    Disposition: TBD    Hospital Problems  Date Reviewed: 3/29/2020          Codes Class Noted POA    * (Principal) Hydronephrosis with obstructing calculus ICD-10-CM: N13.2  ICD-9-CM: 911 Acute 3/29/2020 Yes        Severe dehydration ICD-10-CM: E86.0  ICD-9-CM: 276.51 Acute 3/29/2020 Yes        Kidney disease, chronic, stage IV (severe, EGFR 15-29 ml/min) (HCC) (Chronic) ICD-10-CM: N18.4  ICD-9-CM: 585.4 End Stage 3/29/2020 Yes        Acute kidney injury superimposed on CKD (HCC) (Chronic) ICD-10-CM: N17.9, N18.9  ICD-9-CM: 866.00, 585.9 Acute 3/29/2020 Yes        Diabetic polyneuropathy associated with type 2 diabetes mellitus ([facility] ICD-10-CM: E11.42  ICD-9-CM: 250.60, 357.2 Chronic 3/29/2020 Yes        Generalized anxiety disorder (Chronic) ICD-10-CM: F41.1  ICD-9-CM: 300.02 Chronic 3/29/2020 Yes        Hypovolemic shock ([facility].) ICD-10-CM: R57.1  ICD-9-CM: 785.59  3/29/2020 Yes                Review of Systems:   A comprehensive review of systems was negative. Physical Examination:     General appearance: alert, cooperative, no distress, appears stated age  Head: Normocephalic, without obvious abnormality, atraumatic  Eyes: negative findings: anicteric sclera  Throat: Thrush  Lungs: clear to auscultation bilaterally, no wheezing  Heart: RRR. NM  Abdomen: soft, NT, NG  Extremities: no edema  Skin: no lesion  Neurologic: Grossly normal       Vital Signs:    Last 24hrs VS reviewed since prior progress note.  Most recent are:    Visit Vitals  /74 (BP 1 Location: Right arm, BP Patient Position: At rest)   Pulse 72   Temp 98.3 °F (36.8 °C)   Resp 18   Ht 5' 4\" (1.626 m)   Wt 101.9 kg (224 lb 10.4 oz)   SpO2 94%   BMI 38.56 kg/m²         Intake/Output Summary (Last 24 hours) at 3/31/2020 0949  Last data filed at 3/30/2020 2019  Gross per 24 hour   Intake 775 ml   Output 825 ml   Net -50 ml        Tmax:  Temp (24hrs), Av.7 °F (37.6 °C), Min:98.3 °F (36.8 °C), Max:100.6 °F (38.1 °C)      Data Review:   Data reviewed by myself:  Xr Retrograde Pyelogram    Result Date: 3/29/2020  INDICATION: intraop rm 12 EXAM: XR RETROGRADE PYELOGRAM FINDINGS: Portable imaging utilized during procedure. IMPRESSION: Portable imaging during procedure. REPORT PROVIDED FOR COMPLIANCE ONLY AT NO CHARGE. No results found for: SDES  Lab Results   Component Value Date/Time    Culture result: ESCHERICHIA COLI (STRAIN 1) (A) 03/29/2020 06:45 PM    Culture result: ESCHERICHIA COLI (STRAIN 2) (A) 03/29/2020 06:45 PM    Culture result: CHECKING FOR POSSIBLE  3RD GRAM NEGATIVE ANA   (A) 03/29/2020 06:45 PM     All Micro Results     Procedure Component Value Units Date/Time    CULTURE, URINE [[id number]]  (Abnormal)  (Susceptibility) Collected:  03/29/20 1845    Order Status:  Completed Specimen:  Urine from Kidney Updated:  03/31/20 0938     Special Requests: NO SPECIAL REQUESTS        [place] Count --        >100,000  COLONIES/mL       Culture result:       ESCHERICHIA COLI (STRAIN 1)                  ESCHERICHIA COLI (STRAIN 2)                  CHECKING FOR POSSIBLE  3RD GRAM NEGATIVE ANA      CULTURE, URINE [[id number]] Collected:  03/29/20 0345    Order Status:  Completed Specimen:  Cath Urine Updated:  03/30/20 0908     Special Requests: NO SPECIAL REQUESTS        Culture result: No growth (<1,000 CFU/ML)             Labs: reviewed by myself. Recent Labs     03/31/20  0250 03/29/20  0600   WBC 7.8 11.1*   HGB 9.1* 8.8*   HCT 28.9* 28.5*    200     Recent Labs     03/31/20  0250 03/30/20  0411 03/29/20  1828 03/29/20  0353    139 138 132*   K 4.3 3.3* 3.3* 3.6   * 112* 110* 103   CO2 19* 20* 21 18*   BUN 19 25* 30* 38*   CREA 1.13* 1.22* 1.56* 1.76*   * 106* 307* 401*   CA 8.7 8.5 8.4* 8.3*   MG  --  2.0 2.0 1.8   PHOS 1.6*  --  2.3* 3.0     Recent Labs     03/29/20  0353   SGOT 21  22   ALT 33  32   *  153*   TBILI 0.4  0.4   TP 6.2*  6.2*   ALB 2.0*  2.0*   GLOB 4.2*  4.2*   LPSE 70*     Recent Labs     03/29/20  0353   INR 1.1   PTP 11.7*   APTT 33.6*      No results for input(s): FE, TIBC, PSAT, FERR in the last 72 hours.    No results found for: FOL, RBCF No results for input(s): PH, PCO2, PO2 in the last 72 hours. No results for input(s): CPK, CKNDX, TROIQ in the last 72 hours.     No lab exists for component: CPKMB  No results found for: CHOL, CHOLX, CHLST, CHOLV, HDL, HDLP, LDL, LDLC, DLDLP, TGLX, TRIGL, TRIGP, CHHD, CHHDX  Lab Results   Component Value Date/Time    Glucose (POC) 221 (H) 03/31/2020 05:57 AM    Glucose (POC) 212 (H) 03/30/2020 09:09 PM    Glucose (POC) 162 (H) 03/30/2020 04:21 PM    Glucose (POC) 145 (H) 03/30/2020 01:57 PM    Glucose (POC) 108 (H) 03/30/2020 11:22 AM     Lab Results   Component Value Date/Time    Color YELLOW/STRAW 03/29/2020 03:53 AM    Appearance CLEAR 03/29/2020 03:53 AM    Specific gravity 1.014 03/29/2020 03:53 AM    pH (UA) 5.0 03/29/2020 03:53 AM    Protein NEGATIVE  03/29/2020 03:53 AM    Glucose >1,000 (A) 03/29/2020 03:53 AM    Ketone TRACE (A) 03/29/2020 03:53 AM    Bilirubin NEGATIVE  03/29/2020 03:53 AM    Urobilinogen 0.2 03/29/2020 03:53 AM    Nitrites NEGATIVE  03/29/2020 03:53 AM    Leukocyte Esterase TRACE (A) 03/29/2020 03:53 AM    Epithelial cells FEW 03/29/2020 03:53 AM    Bacteria NEGATIVE  03/29/2020 03:53 AM    WBC 0-4 03/29/2020 03:53 AM    RBC 0-5 03/29/2020 03:53 AM         Medications Reviewed:     Current Facility-Administered Medications   Medication Dose Route Frequency    potassium, sodium phosphates (NEUTRA-PHOS) packet 1 Packet  1 Packet Oral QID    insulin glargine (LANTUS) injection 15 Units  15 Units SubCUTAneous DAILY    glucose chewable tablet 16 g  4 Tab Oral PRN    insulin lispro (HUMALOG) injection   SubCUTAneous AC&HS    heparin (porcine) injection 5,000 Units  5,000 Units SubCUTAneous Q8H    sertraline (ZOLOFT) tablet 100 mg  100 mg Oral DAILY    sodium chloride (NS) flush 5-40 mL  5-40 mL IntraVENous Q8H    sodium chloride (NS) flush 5-40 mL  5-40 mL IntraVENous PRN    acetaminophen (TYLENOL) solution 650 mg  650 mg Oral Q4H PRN    piperacillin-tazobactam (ZOSYN) 3.375 g in 0.9% sodium chloride (MBP/ADV) 100 mL  3.375 g IntraVENous Q8H    morphine injection 2 mg  2 mg IntraVENous Q2H PRN    glucagon (GLUCAGEN) injection 1 mg  1 mg IntraMUSCular PRN    dextrose 10% infusion 0-250 mL  0-250 mL IntraVENous PRN    atorvastatin (LIPITOR) tablet 20 mg  20 mg Oral DAILY    gabapentin (NEURONTIN) capsule 100 mg  100 mg Oral TID    pantoprazole (PROTONIX) tablet 40 mg  40 mg Oral DAILY     ______________________________________________________________________  EXPECTED LENGTH OF STAY: 9d 21h  ACTUAL LENGTH OF STAY:          2                 Cherry Cisneros MD     Patient's emergency contacts:  Extended Emergency Contact Information  Primary Emergency Contact: jessika stone  Address: [address] [facility] Phone: [phone]  Mobile Phone: [phone]  Relation: Spouse '

## 2020-03-31 NOTE — DISCHARGE INSTRUCTIONS
Discharge Instructions       PATIENT ID: Demetria Mendoza  MRN: [id number]   YOB: 1960    DATE OF ADMISSION: 3/29/2020  2:48 AM    DATE OF DISCHARGE: 3/31/2020    PRIMARY CARE PROVIDER: UNKNOWN     ATTENDING PHYSICIAN: Kelin Kim MD  DISCHARGING PROVIDER: Shady Kan MD    To contact this individual call [phone] and ask the  to page. If unavailable ask to be transferred the Adult Hospitalist Department. DISCHARGE DIAGNOSES   Hydronephrosis with obstructing calculus  Dr Jessi Ramon from urology placed ureteral stent, F/U with Urology as recommended for stent removal     Pyelonephritis  Urine C/S: 2 strains of E. Coli, pan-sensitive, will DC home on PO keflex. Family requests that patient be discharged to home today. PT/OT recs HHN with PT  Urology notes reviewed, recs 2 weeks of ABX therapy in total     Acute kidney injury superimposed on CKD  resolved    IDDM2 with long term insulin use, diabetic polyneuropathy, uncontrolled  Off insulin drip  HbA1c 13, will need home medication (glipizide and GLP 1 inhibitors) as well as Lantus insulin and sliding scale insulin. Watch for hypoglycemia  Needs outpatient Diabetic education  Cont Gabapentin     Hypokalemia: resolved    Hypophosphatemia: replete    Generalized anxiety disorder  Cont home sertraline  Supportive care    CONSULTATIONS: IP CONSULT TO UROLOGY    PROCEDURES/SURGERIES: Procedure(s):  CYSTOSCOPY LEFT URETERAL STENT INSERTION. BILATERAL RETROGRADE.     PENDING TEST RESULTS:   At the time of discharge the following test results are still pending:   Final Urine culture  Please follow up with PCP    FOLLOW UP APPOINTMENTS:   Follow-up Information     Follow up With Specialties Details Why Contact [facility]  Schedule an appointment as soon as possible for a visit diabetes management-A1C 13.7% [address] [facility] [address] Moore Aas, MD Urology Schedule an appointment as soon as possible for a visit in 2 weeks as recommended [facility]  [phone]      [facility] family medicine/PCP  In 1 week for post-hospitalization follow up, with in 7 days, for follow Up and to discuss further diabetic control            ADDITIONAL CARE RECOMMENDATIONS:   · It is important that you take the medication exactly as they are prescribed. · Keep your medication in the bottles provided by the pharmacist and keep a list of the medication names, dosages, and times to be taken in your wallet. · Do not take other medications without consulting your doctor. · No drinking alcohol or driving car or operating machinery if you are on narcotic pain medications. Donot take sedating mediations if you are sleepy or confused. · Fall Precautions  · Keep Well Hydrated  · Report to your medical provider if you feel you have  developed allergies to medications  · Follow up with your PCP or Consultant for medication adjustments and refills  · Monitor for signs of fevers,chills,bleeding,chest pain and seek medical attention if you do so. DIET: diabetic diet    ACTIVITY: Ambulate in house and PT/OT per [address]: n/a    EQUIPMENT needed: n/a      DISCHARGE MEDICATIONS:   See Medication Reconciliation Form    · It is important that you take the medication exactly as they are prescribed. · Keep your medication in the bottles provided by the pharmacist and keep a list of the medication names, dosages, and times to be taken in your wallet. · Do not take other medications without consulting your doctor. NOTIFY YOUR PHYSICIAN FOR ANY OF THE FOLLOWING:   Fever over 101 degrees for 24 hours. Chest pain, shortness of breath, fever, chills, nausea, vomiting, diarrhea, change in mentation, falling, weakness, bleeding. Severe pain or pain not relieved by medications.   Or, any other signs or symptoms that you may have questions about.      DISPOSITION:   x Home With:   OT x PT x HH  RN       SNF/Inpatient Rehab/LTAC    Independent/assisted living    Hospice    Other:         Information obtained by :   I understand that if any problems occur once I am at home I am to contact my physician. I understand and acknowledge receipt of the instructions indicated above.                                                                                                                                              Physician's or R.N.'s Signature                                                                  Date/Time                                                                                                                                              Patient or Representative Signature                                                          Date/Time          Signed:   Denver Shen MD  3/31/2020  3:19 PM

## 2020-03-31 NOTE — DIABETES MGMT
[facility]  CLINICAL NURSE SPECIALIST CONSULT  PROGRAM FOR DIABETES HEALTH    FOLLOW-UP NOTE    Presentation   Kenyatta Bello is a 56 y.o. female admitted on 3/29/20 with hydronephrosis with obstructing calculus. Stents placed and being treated for pyleonephritis. Current clinical course has been complicated by continued hyperglycemia. Diabetes: A1C: 13.7%. PTA diabetic meds include: Glipizide 10mg daily; and Ozempic weekly. Subjective   Patient is resting/sleeping at this time. Objective   Physical exam  General Sleeping  Vital Signs   Visit Vitals  /74 (BP 1 Location: Right arm, BP Patient Position: At rest)   Pulse 72   Temp 98.3 °F (36.8 °C)   Resp 18   Ht 5' 4\" (1.626 m)   Wt 101.9 kg (224 lb 10.4 oz)   SpO2 94%   BMI 38.56 kg/m²   . Laboratory  Lab Results   Component Value Date/Time    Hemoglobin A1c 13.7 (H) 03/29/2020 03:53 AM     No results found for: LDL, LDLC, DLDLP  Lab Results   Component Value Date/Time    Creatinine 1.13 (H) 03/31/2020 02:50 AM     Lab Results   Component Value Date/Time    Sodium 136 03/31/2020 02:50 AM    Potassium 4.3 03/31/2020 02:50 AM    Chloride 109 (H) 03/31/2020 02:50 AM    CO2 19 (L) 03/31/2020 02:50 AM    Anion gap 8 03/31/2020 02:50 AM    Glucose 192 (H) 03/31/2020 02:50 AM    BUN 19 03/31/2020 02:50 AM    Creatinine 1.13 (H) 03/31/2020 02:50 AM    BUN/Creatinine ratio 17 03/31/2020 02:50 AM    GFR est AA 59 (L) 03/31/2020 02:50 AM    GFR est non-AA 49 (L) 03/31/2020 02:50 AM    Calcium 8.7 03/31/2020 02:50 AM    Bilirubin, total 0.4 03/29/2020 03:53 AM    Bilirubin, total 0.4 03/29/2020 03:53 AM    AST (SGOT) 21 03/29/2020 03:53 AM    AST (SGOT) 22 03/29/2020 03:53 AM    Alk. phosphatase 146 (H) 03/29/2020 03:53 AM    Alk.  phosphatase 153 (H) 03/29/2020 03:53 AM    Protein, total 6.2 (L) 03/29/2020 03:53 AM    Protein, total 6.2 (L) 03/29/2020 03:53 AM    Albumin 2.0 (L) 03/29/2020 03:53 AM    Albumin 2.0 (L) 03/29/2020 03:53 AM    Globulin 4.2 (H) 03/29/2020 03:53 AM    Globulin 4.2 (H) 03/29/2020 03:53 AM    A-G Ratio 0.5 (L) 03/29/2020 03:53 AM    A-G Ratio 0.5 (L) 03/29/2020 03:53 AM    ALT (SGPT) 33 03/29/2020 03:53 AM    ALT (SGPT) 32 03/29/2020 03:53 AM     Lab Results   Component Value Date/Time    ALT (SGPT) 33 03/29/2020 03:53 AM    ALT (SGPT) 32 03/29/2020 03:53 AM       Blood glucose pattern      Evaluation   Ms. Suh's BG has trended to >250 after insulin drip discontinued. AM fasting . In order to keep BG <200, the insulin subcutaneous orderset would be appropriate. Please follow the recommendations below. Recommendations/Discharge Planning   1. Increase basal insulin to 25units daily. 2.  ADD mealtime  Bolus insulin (administer only if consuming >50% carbohydrates; HOLD for NPO)  O         Normal sensitivity=10units     Corrective insulin  O         Insulin-resistant sensitivity    Discharge Planning: A1C 13.7% (A1c 10.1% in 2/2020)  -Re-start Glipizide 10mg daily (could increase dose since A1c above target  -Re-start Ozempic, but not sure if she is even taking this ( as I saw in a note from PCP she doesn't like injections)  -She needs daily insulin to decrease her A1C, as well as diet modifications.     Follow up with PCP ([address])    Assessment and Plan   Nursing Diagnosis Risk for unstable blood glucose pattern   Nursing Intervention Domain 0243 Decision-making Support   Nursing Interventions Examined current inpatient diabetes control   Explored factors facilitating and impeding inpatient management     Billing Code(s)     [x] 57168 IP subsequent hospital care - 15 minutes    GEMINI Fermin  Access via [facility] [phone]

## 2020-03-31 NOTE — DISCHARGE SUMMARY
Inpatient hospitalist discharge summary                Brief Overview    PATIENT ID: Satnam Whitaker    MRN: [id number]     YOB: 1960    Admitting Provider: Stephani Richards MD    Discharging Provider: Miriam Addison MD   To contact this individual call [phone] and ask the  to page. If unavailable ask to be transferred the Adult Hospitalist Department. PCP at discharge: UNKNOWN None   None    Admission date: 3/29/2020  Date of Discharge: 03/31/20    Chief complaint:   Chief Complaint   Patient presents with    Flank Pain     Patient Active Problem List   Diagnosis Code    Generalized anxiety disorder F41.1    Diabetic polyneuropathy associated with type 2 diabetes mellitus ([facility] [address] E11.42    Kidney disease, chronic, stage IV (severe, EGFR 15-29 ml/min) ([facility]) N18.4         Discharge diagnosis, hospital course/plan:  Hydronephrosis with obstructing calculus  Dr Nya Hill from urology placed ureteral stent, F/U with Urology as recommended for stent removal     Pyelonephritis  Urine C/S: 2 strains of E. Coli, pan-sensitive, will DC home on PO keflex. Family requests that patient be discharged to home today. PT/OT recs HHN with PT  Urology notes reviewed, recs 2 weeks of ABX therapy in total     Acute kidney injury superimposed on CKD  resolved    IDDM2 with long term insulin use, diabetic polyneuropathy, uncontrolled  Off insulin drip  HbA1c 13, will need home medication (glipizide and GLP 1 inhibitors) as well as Lantus insulin and sliding scale insulin. Watch for hypoglycemia  Needs outpatient Diabetic education  Cont Gabapentin     Hypokalemia: resolved    Hypophosphatemia: replete    Generalized anxiety disorder  Cont home sertraline  Supportive care    On the date of discharge, diagnostic face to face encounter was performed. Patient was hemodynamically stable, offering no new complaints.  Denies any shortness of breath at rest, no fevers or chills, no diarrhea or constipation. Patient requests discharge. Patient and family understood and verbalized the understanding of the discharge plan. They understand that final urine cx is pending but since patient is improving, family requests that patient be discharged home with promise to return to ED if symptoms recur or new symptoms develop. I called the daughter and answered all questions in detail. Patient was advised to seek medical help/ care or return to ED, if symptoms recur, worsen or new symptoms develop. Discharge Disposition:    home with [facility]    Discharge activity:  Ambulate in house and PT/OT per Home Health    Code status at discharge:  Full Code     Active issues requiring follow up:  UTI  Pyelonephritis      Outpatient follow up:    Future appointments-  No future appointments. Follow-up Information     Follow up With Specialties Details Why Devaughn Kirk  Schedule an appointment as soon as possible for a visit diabetes management-A1C 13.7% [address] [facility] [address].        Wendelin Fothergill, MD Urology Schedule an appointment as soon as possible for a visit in 2 weeks as recommended [facility]  [phone]      [facility]P  In 1 week for post-hospitalization follow up, with in 7 days, for follow Up and to discuss further diabetic control           Test results pending upon discharge:  Final Urine cx    Operative procedures performed:  Procedure(s):  CYSTOSCOPY LEFT URETERAL STENT INSERTION. BILATERAL RETROGRADE. Treatments: IVF, IV Zosyn    Consults:  IP CONSULT TO UROLOGY    Procedures:    Procedure(s):  CYSTOSCOPY LEFT URETERAL STENT INSERTION. BILATERAL RETROGRADE.     Diet:  DIET DIABETIC CONSISTENT CARB  DIET ONE TIME MESSAGE    Pertinent test results:  Xr Retrograde Pyelogram    Result Date: 3/29/2020  INDICATION: intraop rm 12 EXAM: XR RETROGRADE PYELOGRAM FINDINGS: Portable imaging utilized during procedure. IMPRESSION: Portable imaging during procedure. REPORT PROVIDED FOR COMPLIANCE ONLY AT NO CHARGE. Recent Results (from the past 336 hour(s))   CULTURE, URINE    Collection Time: 03/29/20  3:45 AM   Result Value Ref Range    Special Requests: NO SPECIAL REQUESTS      Culture result: No growth (<1,000 CFU/ML)     METABOLIC PANEL, COMPREHENSIVE    Collection Time: 03/29/20  3:53 AM   Result Value Ref Range    Sodium 132 (L) 136 - 145 mmol/L    Potassium 3.6 3.5 - 5.1 mmol/L    Chloride 103 97 - 108 mmol/L    CO2 18 (L) 21 - 32 mmol/L    Anion gap 11 5 - 15 mmol/L    Glucose 401 (H) 65 - 100 mg/dL    BUN 38 (H) 6 - 20 MG/DL    Creatinine 1.76 (H) 0.55 - 1.02 MG/DL    BUN/Creatinine ratio 22 (H) 12 - 20      GFR est AA 35 (L) >60 ml/min/1.73m2    GFR est non-AA 29 (L) >60 ml/min/1.73m2    Calcium 8.3 (L) 8.5 - 10.1 MG/DL    Bilirubin, total 0.4 0.2 - 1.0 MG/DL    ALT (SGPT) 33 12 - 78 U/L    AST (SGOT) 21 15 - 37 U/L    Alk. phosphatase 146 (H) 45 - 117 U/L    Protein, total 6.2 (L) 6.4 - 8.2 g/dL    Albumin 2.0 (L) 3.5 - 5.0 g/dL    Globulin 4.2 (H) 2.0 - 4.0 g/dL    A-G Ratio 0.5 (L) 1.1 - 2.2     HEPATIC FUNCTION PANEL    Collection Time: 03/29/20  3:53 AM   Result Value Ref Range    Protein, total 6.2 (L) 6.4 - 8.2 g/dL    Albumin 2.0 (L) 3.5 - 5.0 g/dL    Globulin 4.2 (H) 2.0 - 4.0 g/dL    A-G Ratio 0.5 (L) 1.1 - 2.2      Bilirubin, total 0.4 0.2 - 1.0 MG/DL    Bilirubin, direct 0.2 0.0 - 0.2 MG/DL    Alk.  phosphatase 153 (H) 45 - 117 U/L    AST (SGOT) 22 15 - 37 U/L    ALT (SGPT) 32 12 - 78 U/L   PHOSPHORUS    Collection Time: 03/29/20  3:53 AM   Result Value Ref Range    Phosphorus 3.0 2.6 - 4.7 MG/DL   MAGNESIUM    Collection Time: 03/29/20  3:53 AM   Result Value Ref Range    Magnesium 1.8 1.6 - 2.4 mg/dL   LACTIC ACID    Collection Time: 03/29/20  3:53 AM   Result Value Ref Range    Lactic acid 1.2 0.4 - 2.0 MMOL/L   LIPASE    Collection Time: 03/29/20  3:53 AM   Result Value Ref Range    Lipase 70 (L) 73 - 393 U/L   PROTHROMBIN TIME + INR    Collection Time: 03/29/20  3:53 AM   Result Value Ref Range    INR 1.1 0.9 - 1.1      Prothrombin time 11.7 (H) 9.0 - 11.1 sec   PTT    Collection Time: 03/29/20  3:53 AM   Result Value Ref Range    aPTT 33.6 (H) 22.1 - 32.0 sec    aPTT, therapeutic range     58.0 - 77.0 SECS   URINALYSIS W/MICROSCOPIC    Collection Time: 03/29/20  3:53 AM   Result Value Ref Range    Color YELLOW/STRAW      Appearance CLEAR CLEAR      Specific gravity 1.014 1.003 - 1.030      pH (UA) 5.0 5.0 - 8.0      Protein NEGATIVE  NEG mg/dL    Glucose >1,000 (A) NEG mg/dL    Ketone TRACE (A) NEG mg/dL    Bilirubin NEGATIVE  NEG      Blood SMALL (A) NEG      Urobilinogen 0.2 0.2 - 1.0 EU/dL    Nitrites NEGATIVE  NEG      Leukocyte Esterase TRACE (A) NEG      WBC 0-4 0 - 4 /hpf    RBC 0-5 0 - 5 /hpf    Epithelial cells FEW FEW /lpf    Bacteria NEGATIVE  NEG /hpf   TYPE & SCREEN    Collection Time: 03/29/20  3:53 AM   Result Value Ref Range    Crossmatch Expiration 04/01/2020     ABO/Rh(D) A POSITIVE     Antibody screen NEG    PROCALCITONIN    Collection Time: 03/29/20  3:53 AM   Result Value Ref Range    Procalcitonin 101.45 ng/mL   HEMOGLOBIN A1C WITH EAG    Collection Time: 03/29/20  3:53 AM   Result Value Ref Range    Hemoglobin A1c 13.7 (H) 4.0 - 5.6 %    Est. average glucose 346 mg/dL   EKG, 12 LEAD, INITIAL    Collection Time: 03/29/20  4:11 AM   Result Value Ref Range    Ventricular Rate 83 BPM    Atrial Rate 83 BPM    P-R Interval 152 ms    QRS Duration 86 ms    Q-T Interval 386 ms    QTC Calculation (Bezet) 453 ms    Calculated P Axis 11 degrees    Calculated R Axis 63 degrees    Calculated T Axis 93 degrees    Diagnosis       Normal sinus rhythm  ST & T wave abnormality, consider anterior ischemia  No previous ECGs available  Confirmed by Audrey Huitron M.D., [facility] ([id number]) on 3/29/2020 1:16:42 PM     CBC WITH AUTOMATED DIFF    Collection Time: 03/29/20  6:00 AM   Result Value Ref Range    WBC 11.1 (H) 3.6 - 11.0 K/uL    RBC 3.52 (L) 3.80 - 5.20 M/uL    HGB 8.8 (L) 11.5 - 16.0 g/dL    HCT 28.5 (L) 35.0 - 47.0 %    MCV 81.0 80.0 - 99.0 FL    MCH 25.0 (L) 26.0 - 34.0 PG    MCHC 30.9 30.0 - 36.5 g/dL    RDW 13.3 11.5 - 14.5 %    PLATELET 809 168 - 585 K/uL    MPV 10.7 8.9 - 12.9 FL    NRBC 0.0 0  WBC    ABSOLUTE NRBC 0.00 0.00 - 0.01 K/uL    NEUTROPHILS 81 (H) 32 - 75 %    LYMPHOCYTES 12 12 - 49 %    MONOCYTES 7 5 - 13 %    EOSINOPHILS 0 0 - 7 %    BASOPHILS 0 0 - 1 %    IMMATURE GRANULOCYTES 0 %    ABS. NEUTROPHILS 9.0 (H) 1.8 - 8.0 K/UL    ABS. LYMPHOCYTES 1.3 0.8 - 3.5 K/UL    ABS. MONOCYTES 0.8 0.0 - 1.0 K/UL    ABS. EOSINOPHILS 0.0 0.0 - 0.4 K/UL    ABS. BASOPHILS 0.0 0.0 - 0.1 K/UL    ABS. IMM.  GRANS. 0.0 K/UL    DF MANUAL      RBC COMMENTS NORMOCYTIC, NORMOCHROMIC     GLUCOSE, POC    Collection Time: 03/29/20 12:02 PM   Result Value Ref Range    Glucose (POC) 485 (H) 65 - 100 mg/dL    Performed by [address] [place] [address], POC    Collection Time: 03/29/20  4:24 PM   Result Value Ref Range    Glucose (POC) 395 (H) 65 - 100 mg/dL    Performed by Gill Meter    Collection Time: 03/29/20  5:25 PM   Result Value Ref Range    Glucose 399 mg/dL    Insulin order 10.2 units/hour    Insulin adminstered 10.2 units/hour    Multiplier 0.030     Low target 100 mg/dL    High target 140 mg/dL    D50 order 0.0 ml    D50 administered 0.00 ml    Minutes until next BG 60 min    Order initials z     Administered initials z     GLSCOM Comments     METABOLIC PANEL, BASIC    Collection Time: 03/29/20  6:28 PM   Result Value Ref Range    Sodium 138 136 - 145 mmol/L    Potassium 3.3 (L) 3.5 - 5.1 mmol/L    Chloride 110 (H) 97 - 108 mmol/L    CO2 21 21 - 32 mmol/L    Anion gap 7 5 - 15 mmol/L    Glucose 307 (H) 65 - 100 mg/dL    BUN 30 (H) 6 - 20 MG/DL    Creatinine 1.56 (H) 0.55 - 1.02 MG/DL    BUN/Creatinine ratio 19 12 - 20 GFR est AA 41 (L) >60 ml/min/1.73m2    GFR est non-AA 34 (L) >60 ml/min/1.73m2    Calcium 8.4 (L) 8.5 - 10.1 MG/DL   MAGNESIUM    Collection Time: 03/29/20  6:28 PM   Result Value Ref Range    Magnesium 2.0 1.6 - 2.4 mg/dL   PHOSPHORUS    Collection Time: 03/29/20  6:28 PM   Result Value Ref Range    Phosphorus 2.3 (L) 2.6 - 4.7 MG/DL   CULTURE, URINE    Collection Time: 03/29/20  6:45 PM   Result Value Ref Range    Special Requests: NO SPECIAL REQUESTS      [place] Count >100,000  COLONIES/mL        Culture result: ESCHERICHIA COLI (STRAIN 1) (A)      Culture result: ESCHERICHIA COLI (STRAIN 2) (A)      Culture result: CHECKING FOR POSSIBLE  3RD GRAM NEGATIVE ANA   (A)         Susceptibility    Escherichia coli - NEO     Amikacin ($) <=2 Susceptible ug/mL     Ampicillin ($) 8 Susceptible ug/mL     Ampicillin/sulbactam ($) 4 Susceptible ug/mL     Cefazolin ($) <=4 Susceptible ug/mL     Ceftazidime ($) <=1 Susceptible ug/mL     Ceftriaxone ($) <=1 Susceptible ug/mL     Cefepime ($$) <=1 Susceptible ug/mL     Ciprofloxacin ($) <=0.25 Susceptible ug/mL     Gentamicin ($) <=1 Susceptible ug/mL     Levofloxacin ($) <=0.12 Susceptible ug/mL     Meropenem ($$) <=0.25 Susceptible ug/mL     Piperacillin/Tazobac ($) <=4 Susceptible ug/mL     Tobramycin ($) <=1 Susceptible ug/mL     Trimeth/Sulfa <=20 Susceptible ug/mL     Ext. Spec.  Beta Lactamase NEGATIVE  Susceptible ug/mL     Cefoxitin 16 Intermediate ug/mL     Nitrofurantoin <=16 Susceptible ug/mL    Escherichia coli - NEO     Amikacin ($) <=2 Susceptible ug/mL     Ampicillin ($) 8 Susceptible ug/mL     Ampicillin/sulbactam ($) <=2 Susceptible ug/mL     Cefazolin ($) <=4 Susceptible ug/mL     Ceftazidime ($) <=1 Susceptible ug/mL     Ceftriaxone ($) <=1 Susceptible ug/mL     Cefepime ($$) <=1 Susceptible ug/mL     Ciprofloxacin ($) <=0.25 Susceptible ug/mL     Gentamicin ($) <=1 Susceptible ug/mL     Levofloxacin ($) <=0.12 Susceptible ug/mL     Meropenem ($$) <=0.25 Susceptible ug/mL     Piperacillin/Tazobac ($) <=4 Susceptible ug/mL     Tobramycin ($) <=1 Susceptible ug/mL     Trimeth/Sulfa <=20 Susceptible ug/mL     Ext. Spec.  Beta Lactamase NEGATIVE  Susceptible ug/mL     Cefoxitin <=4 Susceptible ug/mL     Nitrofurantoin <=16 Susceptible ug/mL   GLUCOSE, POC    Collection Time: 03/29/20  6:49 PM   Result Value Ref Range    Glucose (POC) 348 (H) 65 - 100 mg/dL    Performed by Kamilah Shultz    Collection Time: 03/29/20  6:51 PM   Result Value Ref Range    Glucose 345 mg/dL    Insulin order 11.4 units/hour    Insulin adminstered 11.4 units/hour    Multiplier 0.040     Low target 100 mg/dL    High target 140 mg/dL    D50 order 0.0 ml    D50 administered 0.00 ml    Minutes until next BG 60 min    Order initials Z     Administered initials Z     GLSCOM Comments     GLUCOSE, POC    Collection Time: 03/29/20  8:03 PM   Result Value Ref Range    Glucose (POC) 232 (H) 65 - 100 mg/dL    Performed by Griselda Oropzea    Collection Time: 03/29/20  8:04 PM   Result Value Ref Range    Glucose 232 mg/dL    Insulin order 6.9 units/hour    Insulin adminstered 6.9 units/hour    Multiplier 0.040     Low target 100 mg/dL    High target 140 mg/dL    D50 order 0.0 ml    D50 administered 0.00 ml    Minutes until next BG 60 min    Order initials ejb     Administered initials ejb     GLSCOM Comments     GLUCOSE, POC    Collection Time: 03/29/20  9:04 PM   Result Value Ref Range    Glucose (POC) 158 (H) 65 - 100 mg/dL    Performed by Griselda Oropeza    Collection Time: 03/29/20  9:05 PM   Result Value Ref Range    Glucose 158 mg/dL    Insulin order 4.9 units/hour    Insulin adminstered 4.9 units/hour    Multiplier 0.050     Low target 100 mg/dL    High target 140 mg/dL    D50 order 0.0 ml    D50 administered 0.00 ml    Minutes until next BG 60 min    Order initials ejb     Administered initials ejb     GLSCOM Comments     GLUCOSE, POC Collection Time: 03/29/20 10:04 PM   Result Value Ref Range    Glucose (POC) 145 (H) 65 - 100 mg/dL    Performed by Maria Fernanda Agosto    Collection Time: 03/29/20 10:05 PM   Result Value Ref Range    Glucose 145 mg/dL    Insulin order 5.1 units/hour    Insulin adminstered 5.1 units/hour    Multiplier 0.060     Low target 100 mg/dL    High target 140 mg/dL    D50 order 0.0 ml    D50 administered 0.00 ml    Minutes until next BG 60 min    Order initials ejb     Administered initials ejb     GLSCOM Comments     GLUCOSE, POC    Collection Time: 03/29/20 11:04 PM   Result Value Ref Range    Glucose (POC) 140 (H) 65 - 100 mg/dL    Performed by Maria Fernanda Agosto    Collection Time: 03/29/20 11:05 PM   Result Value Ref Range    Glucose 140 mg/dL    Insulin order 4.8 units/hour    Insulin adminstered 4.8 units/hour    Multiplier 0.060     Low target 100 mg/dL    High target 140 mg/dL    D50 order 0.0 ml    D50 administered 0.00 ml    Minutes until next BG 60 min    Order initials ejb     Administered initials ejb     GLSCOM Comments     GLUCOSE, POC    Collection Time: 03/30/20 12:01 AM   Result Value Ref Range    Glucose (POC) 124 (H) 65 - 100 mg/dL    Performed by Maria Fernanda Agosto    Collection Time: 03/30/20 12:02 AM   Result Value Ref Range    Glucose 124 mg/dL    Insulin order 3.8 units/hour    Insulin adminstered 3.8 units/hour    Multiplier 0.060     Low target 100 mg/dL    High target 140 mg/dL    D50 order 0.0 ml    D50 administered 0.00 ml    Minutes until next BG 60 min    Order initials ejb     Administered initials ejb     GLSCOM Comments     GLUCOSE, POC    Collection Time: 03/30/20  1:01 AM   Result Value Ref Range    Glucose (POC) 107 (H) 65 - 100 mg/dL    Performed by Maria Fernanda Agosto    Collection Time: 03/30/20  1:01 AM   Result Value Ref Range    Glucose 107 mg/dL    Insulin order 2.8 units/hour    Insulin adminstered 2.8 units/hour Multiplier 0.060     Low target 100 mg/dL    High target 140 mg/dL    D50 order 0.0 ml    D50 administered 0.00 ml    Minutes until next BG 60 min    Order initials ejb     Administered initials ejb     GLSCOM Comments     GLUCOSE, POC    Collection Time: 03/30/20  2:03 AM   Result Value Ref Range    Glucose (POC) 101 (H) 65 - 100 mg/dL    Performed by Jayson Shipley    Collection Time: 03/30/20  2:04 AM   Result Value Ref Range    Glucose 101 mg/dL    Insulin order 2.5 units/hour    Insulin adminstered 2.5 units/hour    Multiplier 0.060     Low target 100 mg/dL    High target 140 mg/dL    D50 order 0.0 ml    D50 administered 0.00 ml    Minutes until next BG 60 min    Order initials ejb     Administered initials ejb     GLSCOM Comments     GLUCOSE, POC    Collection Time: 03/30/20  3:01 AM   Result Value Ref Range    Glucose (POC) 95 65 - 100 mg/dL    Performed by Jayson Shipley    Collection Time: 03/30/20  3:02 AM   Result Value Ref Range    Glucose 95 mg/dL    Insulin order 1.7 units/hour    Insulin adminstered 1.7 units/hour    Multiplier 0.048     Low target 100 mg/dL    High target 140 mg/dL    D50 order 0.0 ml    D50 administered 0.00 ml    Minutes until next BG 60 min    Order initials ejb     Administered initials ejb     GLSCOM Comments     GLUCOSE, POC    Collection Time: 03/30/20  4:07 AM   Result Value Ref Range    Glucose (POC) 106 (H) 65 - 100 mg/dL    Performed by Jayson Shipley    Collection Time: 03/30/20  4:08 AM   Result Value Ref Range    Glucose 106 mg/dL    Insulin order 2.2 units/hour    Insulin adminstered 2.2 units/hour    Multiplier 0.048     Low target 100 mg/dL    High target 140 mg/dL    D50 order 0.0 ml    D50 administered 0.00 ml    Minutes until next BG 60 min    Order initials ejb     Administered initials ejb     GLSCOM Comments     SED RATE (ESR)    Collection Time: 03/30/20  4:11 AM   Result Value Ref Range    Sed rate, automated >140 (H) 0 - 30 mm/hr   C REACTIVE PROTEIN, QT    Collection Time: 03/30/20  4:11 AM   Result Value Ref Range    C-Reactive protein 27.30 (H) 0.00 - 2.73 mg/dL   METABOLIC PANEL, BASIC    Collection Time: 03/30/20  4:11 AM   Result Value Ref Range    Sodium 139 136 - 145 mmol/L    Potassium 3.3 (L) 3.5 - 5.1 mmol/L    Chloride 112 (H) 97 - 108 mmol/L    CO2 20 (L) 21 - 32 mmol/L    Anion gap 7 5 - 15 mmol/L    Glucose 106 (H) 65 - 100 mg/dL    BUN 25 (H) 6 - 20 MG/DL    Creatinine 1.22 (H) 0.55 - 1.02 MG/DL    BUN/Creatinine ratio 20 12 - 20      GFR est AA 54 (L) >60 ml/min/1.73m2    GFR est non-AA 45 (L) >60 ml/min/1.73m2    Calcium 8.5 8.5 - 10.1 MG/DL   MAGNESIUM    Collection Time: 03/30/20  4:11 AM   Result Value Ref Range    Magnesium 2.0 1.6 - 2.4 mg/dL   GLUCOSE, POC    Collection Time: 03/30/20  5:05 AM   Result Value Ref Range    Glucose (POC) 129 (H) 65 - 100 mg/dL    Performed by Eduin Back    Collection Time: 03/30/20  5:06 AM   Result Value Ref Range    Glucose 129 mg/dL    Insulin order 3.3 units/hour    Insulin adminstered 3.3 units/hour    Multiplier 0.048     Low target 100 mg/dL    High target 140 mg/dL    D50 order 0.0 ml    D50 administered 0.00 ml    Minutes until next BG 60 min    Order initials ejb     Administered initials ejb     GLSCOM Comments     GLUCOSE, POC    Collection Time: 03/30/20  6:06 AM   Result Value Ref Range    Glucose (POC) 114 (H) 65 - 100 mg/dL    Performed by Eduin Back    Collection Time: 03/30/20  6:07 AM   Result Value Ref Range    Glucose 114 mg/dL    Insulin order 2.6 units/hour    Insulin adminstered 2.6 units/hour    Multiplier 0.048     Low target 100 mg/dL    High target 140 mg/dL    D50 order 0.0 ml    D50 administered 0.00 ml    Minutes until next BG 60 min    Order initials ejb     Administered initials ejb     GLSCOM Comments     GLUCOSE, POC    Collection Time: 03/30/20  7:05 AM   Result Value Ref Range    Glucose (POC) 119 (H) 65 - 100 mg/dL    Performed by Jayson Shipley    Collection Time: 03/30/20  7:05 AM   Result Value Ref Range    Glucose 119 mg/dL    Insulin order 2.8 units/hour    Insulin adminstered 2.8 units/hour    Multiplier 0.048     Low target 100 mg/dL    High target 140 mg/dL    D50 order 0.0 ml    D50 administered 0.00 ml    Minutes until next BG 60 min    Order initials ejb     Administered initials ejb     GLSCOM Comments     GLUCOSE, POC    Collection Time: 03/30/20  8:09 AM   Result Value Ref Range    Glucose (POC) 96 65 - 100 mg/dL    Performed by Mary Rocks    Collection Time: 03/30/20  8:09 AM   Result Value Ref Range    Glucose 96 mg/dL    Insulin order 1.4 units/hour    Insulin adminstered 1.4 units/hour    Multiplier 0.038     Low target 100 mg/dL    High target 140 mg/dL    D50 order 0.0 ml    D50 administered 0.00 ml    Minutes until next BG 60 min    Order initials fw     Administered initials fw     GLSCOM Comments     GLUCOSE, POC    Collection Time: 03/30/20  9:16 AM   Result Value Ref Range    Glucose (POC) 135 (H) 65 - 100 mg/dL    Performed by [address] [place] [address]    Collection Time: 03/30/20  9:16 AM   Result Value Ref Range    Glucose 135 mg/dL    Insulin order 2.9 units/hour    Insulin adminstered 2.9 units/hour    Multiplier 0.038     Low target 100 mg/dL    High target 140 mg/dL    D50 order 0.0 ml    D50 administered 0.00 ml    Minutes until next BG 60 min    Order initials mom     Administered initials mom     GLSCOM Comments     GLUCOSE, POC    Collection Time: 03/30/20 10:18 AM   Result Value Ref Range    Glucose (POC) 123 (H) 65 - 100 mg/dL    Performed by Stan Del Toro    Collection Time: 03/30/20 10:18 AM   Result Value Ref Range    Glucose 123 mg/dL    Insulin order 2.4 units/hour    Insulin adminstered 2.4 units/hour    Multiplier 0.038     Low target 100 mg/dL High target 140 mg/dL    D50 order 0.0 ml    D50 administered 0.00 ml    Minutes until next BG 60 min    Order initials hc     Administered initials hc     GLSCOM Comments     GLUCOSE, POC    Collection Time: 03/30/20 11:22 AM   Result Value Ref Range    Glucose (POC) 108 (H) 65 - 100 mg/dL    Performed by Telma Pradhan    Collection Time: 03/30/20 11:23 AM   Result Value Ref Range    Glucose 108 mg/dL    Insulin order 1.8 units/hour    Insulin adminstered 1.8 units/hour    Multiplier 0.038     Low target 100 mg/dL    High target 140 mg/dL    D50 order 0.0 ml    D50 administered 0.00 ml    Minutes until next BG 60 min    Order initials JK     Administered initials JK     GLSCOM Comments     GLUCOSE, POC    Collection Time: 03/30/20  1:57 PM   Result Value Ref Range    Glucose (POC) 145 (H) 65 - 100 mg/dL    Performed by Ignacia Calvin    GLUCOSE, POC    Collection Time: 03/30/20  4:21 PM   Result Value Ref Range    Glucose (POC) 162 (H) 65 - 100 mg/dL    Performed by Fuad Lim    GLUCOSE, POC    Collection Time: 03/30/20  9:09 PM   Result Value Ref Range    Glucose (POC) 212 (H) 65 - 100 mg/dL    Performed by Jose J Marker    CBC W/O DIFF    Collection Time: 03/31/20  2:50 AM   Result Value Ref Range    WBC 7.8 3.6 - 11.0 K/uL    RBC 3.66 (L) 3.80 - 5.20 M/uL    HGB 9.1 (L) 11.5 - 16.0 g/dL    HCT 28.9 (L) 35.0 - 47.0 %    MCV 79.0 (L) 80.0 - 99.0 FL    MCH 24.9 (L) 26.0 - 34.0 PG    MCHC 31.5 30.0 - 36.5 g/dL    RDW 14.0 11.5 - 14.5 %    PLATELET 572 137 - 194 K/uL    MPV 10.7 8.9 - 12.9 FL    NRBC 0.0 0  WBC    ABSOLUTE NRBC 0.00 0.00 - 9.27 K/uL   METABOLIC PANEL, BASIC    Collection Time: 03/31/20  2:50 AM   Result Value Ref Range    Sodium 136 136 - 145 mmol/L    Potassium 4.3 3.5 - 5.1 mmol/L    Chloride 109 (H) 97 - 108 mmol/L    CO2 19 (L) 21 - 32 mmol/L    Anion gap 8 5 - 15 mmol/L    Glucose 192 (H) 65 - 100 mg/dL    BUN 19 6 - 20 MG/DL    Creatinine 1.13 (H) 0.55 - 1.02 MG/DL BUN/Creatinine ratio 17 12 - 20      GFR est AA 59 (L) >60 ml/min/1.73m2    GFR est non-AA 49 (L) >60 ml/min/1.73m2    Calcium 8.7 8.5 - 10.1 MG/DL   PHOSPHORUS    Collection Time: 03/31/20  2:50 AM   Result Value Ref Range    Phosphorus 1.6 (L) 2.6 - 4.7 MG/DL   GLUCOSE, POC    Collection Time: 03/31/20  5:57 AM   Result Value Ref Range    Glucose (POC) 221 (H) 65 - 100 mg/dL    Performed by Yana Vaughan    GLUCOSE, POC    Collection Time: 03/31/20 11:05 AM   Result Value Ref Range    Glucose (POC) 269 (H) 65 - 100 mg/dL    Performed by Debby Sanchez            Physical Exam on Discharge:    Discharge condition: fair    Vital signs:   Patient Vitals for the past 12 hrs:   Temp Pulse Resp BP SpO2   03/31/20 1452 98.9 °F (37.2 °C) 74 22 116/75 93 %   03/31/20 0836 98.3 °F (36.8 °C) 72 18 134/74 94 %       Head: Normocephalic, without obvious abnormality, atraumatic  Lungs: clear to auscultation bilaterally  Heart: regular rate and rhythm, S1, S2 normal, no murmur, click, rub or gallop  Abdomen: soft, non-tender. Bowel sounds normal. No masses,  no organomegaly  Extremities: extremities normal, atraumatic, no cyanosis or edema    Current Discharge Medication List      START taking these medications    Details   insulin glargine (LANTUS,BASAGLAR) 100 unit/mL (3 mL) inpn 15 Units by SubCUTAneous route nightly for 30 days. Qty: 4.5 mL, Refills: 0      nystatin (MYCOSTATIN) 100,000 unit/mL suspension Take 5 mL by mouth four (4) times daily for 7 days. swish and spit  Qty: 140 mL, Refills: 0      potassium, sodium phosphates (NEUTRA-PHOS) 280-160-250 mg packet Take 1 Packet by mouth four (4) times daily for 3 days.   Qty: 12 Packet, Refills: 0      insulin lispro (HUMALOG) 100 unit/mL injection INITIATE CORRECTIVE INSULIN PROTOCOL (JASON):  High Sensitivity (thin, ESRD):    AC (before meals), Q6H, and Q4H CORRECTIONAL SCALE only For Blood Sugar (mg/dl) of :             140-199=0 units            200-249=2 units  250-299=3 units  300-349=4 units  350 or greater = Call MD  Give in addition to basal medications. Do Not Hold for NPO    BEDTIME CORRECTIONAL sliding scale when scheduled:  200-249=1 units  250-349=2 units  350 or greater = Call MD  Give in addition to basal medications. Do Not Hold for NPO Fast Acting - Administer Immediately - or within 15 minutes of start of meal, if mealtime coverage. Qty: 1 Vial, Refills: 1      pen needle, diabetic 33 gauge x 3/16\" ndle As per routine  Qty: 200 Each, Refills: 1      cephALEXin (Keflex) 500 mg capsule Take 1 Cap by mouth three (3) times daily for 12 days. Qty: 36 Cap, Refills: 0      L.acidoph & parac-S.therm-Bifido (NITHIN Q2/RISAQUAD-2) 16 billion cell cap cap Take 1 Cap by mouth daily for 12 days. Qty: 12 Cap, Refills: 0         CONTINUE these medications which have NOT CHANGED    Details   aspirin delayed-release 81 mg tablet Take 81 mg by mouth daily. sertraline (ZOLOFT) 100 mg tablet Take 100 mg by mouth daily. glipiZIDE SR (GLUCOTROL XL) 10 mg CR tablet Take 10 mg by mouth daily. tamsulosin (FLOMAX) 0.4 mg capsule Take 0.4 mg by mouth daily. (For 7 days, starting 3/27/20)      gabapentin (NEURONTIN) 100 mg capsule Take 100 mg by mouth three (3) times daily. topiramate (TOPAMAX) 25 mg tablet Take 25 mg by mouth two (2) times a day. loperamide (IMODIUM) 2 mg capsule Take 2 mg by mouth four (4) times daily as needed for Diarrhea.      propranoloL (INDERAL) 40 mg tablet Take 20 mg by mouth two (2) times a day. atorvastatin (LIPITOR) 20 mg tablet Take 20 mg by mouth daily. omeprazole (PriLOSEC OTC) 20 mg tablet Take 20 mg by mouth daily. HYDROcodone-acetaminophen (NORCO) 5-325 mg per tablet Take 1 Tab by mouth every six (6) hours as needed for Pain.       ondansetron (ZOFRAN ODT) 4 mg disintegrating tablet Take 4 mg by mouth every eight (8) hours as needed for Nausea or Vomiting.      semaglutide (Ozempic) 0.25 mg/0.2 mL (2 mg/1.5 mL) sub-q pen 0.5 mg by SubCUTAneous route every seven (7) days. Started 2/5/20: Inject 0.25 mg every 7 days for 6 weeks, then increase to 0.5 mg every 7 days. STOP taking these medications       ciprofloxacin HCl (CIPRO) 500 mg tablet Comments:   Reason for Stopping:         ibuprofen (MOTRIN) 800 mg tablet Comments:   Reason for Stopping:                Total time spent on discharge planning, counseling and co-ordination of care:   39 minutes    Jack George MD  03/31/20  3:24 PM

## 2020-04-01 ENCOUNTER — TELEPHONE (OUTPATIENT)
Dept: CASE MANAGEMENT | Age: 60
End: 2020-04-01

## 2020-04-01 LAB
BACTERIA SPEC CULT: ABNORMAL
BACTERIA SPEC CULT: ABNORMAL
CC UR VC: ABNORMAL
SERVICE CMNT-IMP: ABNORMAL

## 2020-04-01 NOTE — TELEPHONE ENCOUNTER
4/1/20 11:20 AM--unable to reach pt at home phone # and there is no personal VM greeting. Contacted her spouse who tells me they are away from home right now at a F/U appt and will be busy throughout the day. Agreeable to my return call tomorrow--phone # confirmed. 4/2/20 10:11 AM--attempted to reach pt again today but there is no answer at her home # or her 's cell phone. This conclude this episode of care.

## (undated) DEVICE — JELLY,LUBE,STERILE,FLIP TOP,TUBE,4-OZ: Brand: MEDLINE

## (undated) DEVICE — PACK,CYSTOSCOPY,PK III,SIRUS: Brand: MEDLINE

## (undated) DEVICE — SOLUTION SCRB 2OZ 10% POVIDONE IOD ANTIMIC BTL

## (undated) DEVICE — CATH URET 5FRX70CM POLYUR -- CONVERT TO ITEM 106403

## (undated) DEVICE — TIDISHIELD UROLOGY DRAIN BAGS FROSTY VINYL STERILE FITS SIEMENS UROSKOP ACCESS 20 PER CASE: Brand: TIDISHIELD

## (undated) DEVICE — GOWN,SIRUS,NONRNF,SETINSLV,XL,20/CS: Brand: MEDLINE

## (undated) DEVICE — CYSTO/BLADDER IRRIGATION SET, REGULATING CLAMP

## (undated) DEVICE — SYR 10ML LUER LOK 1/5ML GRAD --

## (undated) DEVICE — CONTAINER,SPEC,PNEUM TUBE,3OZ,STRL PATH: Brand: MEDLINE

## (undated) DEVICE — CATHETER URET L70CM DIA5FR POLYUR SPRL OLV TIP W/ ADPT

## (undated) DEVICE — ADPT CATH URETH 2IN LF --

## (undated) DEVICE — SOLUTION IRRIGATION H2O 0797305] ICU MEDICAL INC]

## (undated) DEVICE — OPEN-END URETERAL CATHETER: Brand: COOK

## (undated) DEVICE — STRAP,POSITIONING,KNEE/BODY,FOAM,4X60": Brand: MEDLINE

## (undated) DEVICE — INFECTION CONTROL KIT SYS

## (undated) DEVICE — MARKER,SKIN,WI/RULER AND LABELS: Brand: MEDLINE